# Patient Record
Sex: FEMALE | Race: BLACK OR AFRICAN AMERICAN | Employment: OTHER | URBAN - METROPOLITAN AREA
[De-identification: names, ages, dates, MRNs, and addresses within clinical notes are randomized per-mention and may not be internally consistent; named-entity substitution may affect disease eponyms.]

---

## 2017-02-07 RX ORDER — SIMVASTATIN 80 MG/1
TABLET, FILM COATED ORAL
Qty: 90 TAB | Refills: 0 | Status: SHIPPED | OUTPATIENT
Start: 2017-02-07 | End: 2017-06-06 | Stop reason: SDUPTHER

## 2017-02-09 RX ORDER — MECLIZINE HYDROCHLORIDE 25 MG/1
TABLET ORAL
Qty: 60 TAB | Refills: 1 | Status: SHIPPED | OUTPATIENT
Start: 2017-02-09 | End: 2017-05-15 | Stop reason: SDUPTHER

## 2017-02-10 RX ORDER — CLONAZEPAM 0.5 MG/1
0.5 TABLET ORAL 2 TIMES DAILY
Qty: 60 TAB | Refills: 1 | OUTPATIENT
Start: 2017-02-10 | End: 2017-05-15 | Stop reason: SDUPTHER

## 2017-02-13 RX ORDER — GABAPENTIN 100 MG/1
100 CAPSULE ORAL 3 TIMES DAILY
Qty: 270 CAP | Refills: 3 | Status: SHIPPED | OUTPATIENT
Start: 2017-02-13 | End: 2018-02-15 | Stop reason: SDUPTHER

## 2017-03-20 RX ORDER — METFORMIN HYDROCHLORIDE 500 MG/1
TABLET, EXTENDED RELEASE ORAL
Qty: 30 TAB | Refills: 2 | Status: SHIPPED | OUTPATIENT
Start: 2017-03-20 | End: 2017-07-03 | Stop reason: SDUPTHER

## 2017-03-24 ENCOUNTER — OFFICE VISIT (OUTPATIENT)
Dept: INTERNAL MEDICINE CLINIC | Age: 82
End: 2017-03-24

## 2017-03-24 ENCOUNTER — HOSPITAL ENCOUNTER (OUTPATIENT)
Dept: LAB | Age: 82
Discharge: HOME | End: 2017-03-24
Payer: MEDICARE

## 2017-03-24 VITALS
HEIGHT: 59 IN | SYSTOLIC BLOOD PRESSURE: 123 MMHG | BODY MASS INDEX: 28.63 KG/M2 | RESPIRATION RATE: 12 BRPM | OXYGEN SATURATION: 96 % | WEIGHT: 142 LBS | HEART RATE: 75 BPM | DIASTOLIC BLOOD PRESSURE: 70 MMHG | TEMPERATURE: 98.3 F

## 2017-03-24 DIAGNOSIS — E78.5 HYPERLIPIDEMIA LDL GOAL <100: ICD-10-CM

## 2017-03-24 DIAGNOSIS — E11.9 TYPE 2 DIABETES MELLITUS WITHOUT COMPLICATION, WITHOUT LONG-TERM CURRENT USE OF INSULIN (HCC): ICD-10-CM

## 2017-03-24 DIAGNOSIS — E11.9 TYPE 2 DIABETES MELLITUS WITHOUT COMPLICATION, WITHOUT LONG-TERM CURRENT USE OF INSULIN (HCC): Primary | ICD-10-CM

## 2017-03-24 DIAGNOSIS — I10 ESSENTIAL HYPERTENSION WITH GOAL BLOOD PRESSURE LESS THAN 140/90: ICD-10-CM

## 2017-03-24 LAB
ALBUMIN SERPL BCP-MCNC: 4.1 G/DL (ref 3.4–5)
ALBUMIN/GLOB SERPL: 1.2 {RATIO} (ref 0.8–1.7)
ALP SERPL-CCNC: 94 U/L (ref 45–117)
ALT SERPL-CCNC: 22 U/L (ref 13–56)
ANION GAP BLD CALC-SCNC: 8 MMOL/L (ref 3–18)
AST SERPL W P-5'-P-CCNC: 21 U/L (ref 15–37)
BILIRUB SERPL-MCNC: 0.3 MG/DL (ref 0.2–1)
BUN SERPL-MCNC: 12 MG/DL (ref 7–18)
BUN/CREAT SERPL: 13 (ref 12–20)
CALCIUM SERPL-MCNC: 9.2 MG/DL (ref 8.5–10.1)
CHLORIDE SERPL-SCNC: 106 MMOL/L (ref 100–108)
CHOLEST SERPL-MCNC: 153 MG/DL
CO2 SERPL-SCNC: 27 MMOL/L (ref 21–32)
CREAT SERPL-MCNC: 0.91 MG/DL (ref 0.6–1.3)
EST. AVERAGE GLUCOSE BLD GHB EST-MCNC: 143 MG/DL
GLOBULIN SER CALC-MCNC: 3.4 G/DL (ref 2–4)
GLUCOSE SERPL-MCNC: 111 MG/DL (ref 74–99)
HBA1C MFR BLD: 6.6 % (ref 4.2–5.6)
HDLC SERPL-MCNC: 62 MG/DL (ref 40–60)
HDLC SERPL: 2.5 {RATIO} (ref 0–5)
LDLC SERPL CALC-MCNC: 77 MG/DL (ref 0–100)
LIPID PROFILE,FLP: ABNORMAL
POTASSIUM SERPL-SCNC: 4.4 MMOL/L (ref 3.5–5.5)
PROT SERPL-MCNC: 7.5 G/DL (ref 6.4–8.2)
SODIUM SERPL-SCNC: 141 MMOL/L (ref 136–145)
TRIGL SERPL-MCNC: 70 MG/DL (ref ?–150)
VLDLC SERPL CALC-MCNC: 14 MG/DL

## 2017-03-24 PROCEDURE — 80053 COMPREHEN METABOLIC PANEL: CPT | Performed by: INTERNAL MEDICINE

## 2017-03-24 PROCEDURE — 83036 HEMOGLOBIN GLYCOSYLATED A1C: CPT | Performed by: INTERNAL MEDICINE

## 2017-03-24 PROCEDURE — 80061 LIPID PANEL: CPT | Performed by: INTERNAL MEDICINE

## 2017-03-24 NOTE — PROGRESS NOTES
Denisha Barkley 5/17/1926, is a 80 y.o. female, who is seen today for reevaluation of diabetes anxiety hyperlipidemia recurrent vertigo. She notes that twice over the last couple of months when she has strained stools using a trace of blood on the toilet paper. She has chronic constipation usually she treats that with zain greens. She has had no falls and vertigo has not bothered her for at least a few weeks. She takes her medicine regularly. 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     Essential hypertension, benign     Stable     Hypercholesterolemia     Hypertension     Other and unspecified hyperlipidemia     Per PMD/ on Zocor    Type II or unspecified type diabetes mellitus without mention of complication, not stated as uncontrolled      Past Surgical History:   Procedure Laterality Date    HX HEENT      bilateral cataract surgery    HX HEMORRHOIDECTOMY      HX ORTHOPAEDIC      bunionectomy    HX OTHER SURGICAL      Esophageal dilatation     Current Outpatient Prescriptions   Medication Sig Dispense Refill    metFORMIN ER (GLUCOPHAGE XR) 500 mg tablet TAKE 1 TABLET BY MOUTH EVERY DAY IN THE MORNING WITH BREAKFAST 30 Tab 2    gabapentin (NEURONTIN) 100 mg capsule Take 1 Cap by mouth three (3) times daily. 270 Cap 3    clonazePAM (KLONOPIN) 0.5 mg tablet Take 1 Tab by mouth two (2) times a day. Max Daily Amount: 1 mg. 60 Tab 1    meclizine (ANTIVERT) 25 mg tablet TAKE 1 TABLET BY MOUTH 3 TIMES A DAY AS NEEDED FOR VERTIGO 60 Tab 1    simvastatin (ZOCOR) 80 mg tablet TAKE 1 TABLET BY MOUTH DAILY 90 Tab 0    Lancets (MICROLET LANCET) misc Once daily, DX: E11.9 100 Each 3    Blood Sugar Diagnostic, Disc (ASCENSIA BREEZE 2) strp Tests once daily, diagnosis E 11.9 100 Strip 3    senna leaf tea Take  by mouth.  ADULT ASPIRIN EC LOW STRENGTH PO Take 81 mg by mouth daily.       senna (SENNA LAX) 8.6 mg tablet Take 1 Tab by mouth. Take 3 at bedtime. No Known Allergies  Social History     Social History    Marital status:      Spouse name: N/A    Number of children: N/A    Years of education: N/A     Social History Main Topics    Smoking status: Never Smoker    Smokeless tobacco: Never Used    Alcohol use No    Drug use: No    Sexual activity: Not Currently     Other Topics Concern    None     Social History Narrative     Visit Vitals    /70    Pulse 75    Temp 98.3 °F (36.8 °C) (Oral)    Resp 12    Ht 4' 11\" (1.499 m)    Wt 142 lb (64.4 kg)    SpO2 96%    BMI 28.68 kg/m2     Carotids are 2+ without bruits. No adenopathy or thyromegaly. Lungs are clear to percussion. Good breath sounds with no wheezing or crackles. Heart reveals a regular rhythm with normal S1 and S2 no murmur gallop click or rub. Apical impulse is not palpable. Abdomen is soft and nontender with no hepatosplenomegaly or masses and no bruits. Extremities reveal no clubbing cyanosis or edema. Pulses are intact throughout. Breasts reveal no masses no skin or nipple abnormalities and no axillary adenopathy. Rectal exam reveals some minor hemorrhoidal tags but no active bleeding. Stool is light brown Hemoccult negative. No mucosal abnormalities are palpable. Assessment: #1.  Diabetes has been doing well. We will check hemoglobin A1c and random glucose this morning. She will continue metformin  mg each morning. #2. Hyperlipidemia has been doing well. We will check lipids today and she will continue simvastatin 80 mg each evening. #3. Some recent bright red blood per rectum with straining at stool and will certainly related to hemorrhoidal disease. She will call if bleeding persists. She will try using prune juice regularly. #4.  Anxiety doing well. She will continue clonazepam 0.5 mg as needed. #5.  History of recurrent vertigo, she will use meclizine as needed.     Follow-up 4 months with [facility] ZAYNAB Klaudia Eisenberg MD FACP    Please note: This document has been produced using voice recognition software. Unrecognized errors in transcription may be present.

## 2017-03-24 NOTE — MR AVS SNAPSHOT
Visit Information Date & Time Provider Department Dept. Phone Encounter #  
 3/24/2017  8:30 AM Garry Villarreal MD Internist of [address] [place] [address] [id number] Follow-up Instructions Follow-up and Disposition History Your Appointments 7/24/2017  7:55 AM  
LAB with [facility] NURSE VISIT Internist of [facility] ([facility]) Appt Note: labs 4mos. rm  
 [address], Suite [facility] [address]  
  
   
 [address], [address]  
  
    
 7/24/2017  8:15 AM  
Office Visit with Garry Villarreal MD  
Internist of [address] [place] [address] Appt Note: ov 4mos rm  
 [address] [facility], [facility] [address]  
  
   
 [address], [address] Upcoming Health Maintenance Date Due DTaP/Tdap/Td series (1 - Tdap) 5/17/1947 FOOT EXAM Q1 8/25/2016 MEDICARE YEARLY EXAM 11/16/2016 HEMOGLOBIN A1C Q6M 5/21/2017 MICROALBUMIN Q1 7/11/2017 EYE EXAM RETINAL OR DILATED Q1 9/28/2017 LIPID PANEL Q1 11/21/2017 GLAUCOMA SCREENING Q2Y 9/28/2018 Allergies as of 3/24/2017  Review Complete On: 3/24/2017 By: Garry Villarreal MD  
 No Known Allergies Current Immunizations  Reviewed on 11/21/2016 Name Date Influenza High Dose Vaccine PF 10/5/2016  9:54 AM  
 Influenza Vaccine 9/20/2014 Pneumococcal Conjugate (PCV-13) 11/21/2016 11:46 AM  
 Pneumococcal Polysaccharide (PPSV-23) 11/17/2015 Not reviewed this visit You Were Diagnosed With   
  
 Codes Comments Type 2 diabetes mellitus without complication, without long-term current use of insulin (HCC)    -  Primary ICD-10-CM: E11.9 ICD-9-CM: 250.00 Essential hypertension with goal blood pressure less than 140/90     ICD-10-CM: I10 
ICD-9-CM: 401.9 Hyperlipidemia LDL goal <100     ICD-10-CM: E78.5 ICD-9-CM: 272.4 Vitals BP Pulse Temp Resp Height(growth percentile) Weight(growth percentile) 123/70 75 98.3 °F (36.8 °C) (Oral) 12 4' 11\" (1.499 m) 142 lb (64.4 kg) SpO2 BMI OB Status Smoking Status 96% 28.68 kg/m2 Postmenopausal Never Smoker Vitals History BMI and BSA Data Body Mass Index Body Surface Area  
 28.68 kg/m 2 1.64 m 2 Preferred Pharmacy Pharmacy Name Phone [facility]ACY #[id number]- [address] [phone] Your Updated Medication List  
  
   
This list is accurate as of: 3/24/17  9:19 AM.  Always use your most recent med list.  
  
  
  
  
 ADULT ASPIRIN EC LOW STRENGTH PO Take 81 mg by mouth daily. Blood Sugar Diagnostic, Disc Strp Commonly known as:  Ascensia Breeze 2 Tests once daily, diagnosis E 11.9  
  
 clonazePAM 0.5 mg tablet Commonly known as:  Bryant Pen Take 1 Tab by mouth two (2) times a day. Max Daily Amount: 1 mg.  
  
 gabapentin 100 mg capsule Commonly known as:  NEURONTIN Take 1 Cap by mouth three (3) times daily. Lancets Misc Commonly known as:  Bernis Balding Once daily, DX: E11.9  
  
 meclizine 25 mg tablet Commonly known as:  ANTIVERT  
TAKE 1 TABLET BY MOUTH 3 TIMES A DAY AS NEEDED FOR VERTIGO  
  
 metFORMIN  mg tablet Commonly known as:  GLUCOPHAGE XR  
TAKE 1 TABLET BY MOUTH EVERY DAY IN THE MORNING WITH BREAKFAST SENNA LAX 8.6 mg tablet Generic drug:  senna Take 1 Tab by mouth. Take 3 at bedtime. senna leaf Tea Take  by mouth. simvastatin 80 mg tablet Commonly known as:  ZOCOR  
TAKE 1 TABLET BY MOUTH DAILY To-Do List   
 Around 03/24/2017 Lab:  HEMOGLOBIN A1C WITH EAG Around 03/24/2017 Lab:  LIPID PANEL Around 03/24/2017 Lab:  METABOLIC PANEL, COMPREHENSIVE Around 07/17/2017 Lab:  CBC WITH AUTOMATED DIFF Around 07/17/2017 Lab:  HEMOGLOBIN A1C WITH EAG Around 07/17/2017 Lab: LIPID PANEL Around 07/17/2017 Lab:  METABOLIC PANEL, COMPREHENSIVE Around 07/17/2017 Lab:  MICROALBUMIN, UR, RAND W/ MICROALBUMIN/CREA RATIO Around 07/17/2017 Lab:  T4, FREE Around 07/17/2017 Lab:  TSH 3RD GENERATION Around 07/17/2017 Lab:  URINALYSIS W/ RFLX MICROSCOPIC Introducing [facility] & HEALTH SERVICES! Charlotte Myrtle introduces eTobb patient portal. Now you can access parts of your medical record, email your doctor's office, and request medication refills online. 1. In your internet browser, go to [email or web address] 2. Click on the First Time User? Click Here link in the Sign In box. You will see the New Member Sign Up page. 3. Enter your eTobb Access Code exactly as it appears below. You will not need to use this code after youve completed the sign-up process. If you do not sign up before the expiration date, you must request a new code. · eTobb Access Code: [email or web address] Expires: 6/22/2017  9:19 AM 
 
4. Enter the last four digits of your Social Security Number (xxxx) and Date of Birth (mm/dd/yyyy) as indicated and click Submit. You will be taken to the next sign-up page. 5. Create a eTobb ID. This will be your eTobb login ID and cannot be changed, so think of one that is secure and easy to remember. 6. Create a eTobb password. You can change your password at any time. 7. Enter your Password Reset Question and Answer. This can be used at a later time if you forget your password. 8. Enter your e-mail address. You will receive e-mail notification when new information is available in [address] 9. Click Sign Up. You can now view and download portions of your medical record. 10. Click the Download Summary menu link to download a portable copy of your medical information.  
 
If you have questions, please visit the Frequently Asked Questions section of the InOpen. Remember, mydecohart is NOT to be used for urgent needs. For medical emergencies, dial 911. Now available from your iPhone and Android! Please provide this summary of care documentation to your next provider. Your primary care clinician is listed as Carol Jacob. Davina López. If you have any questions after today's visit, please call [phone].

## 2017-04-10 ENCOUNTER — TELEPHONE (OUTPATIENT)
Dept: INTERNAL MEDICINE CLINIC | Age: 82
End: 2017-04-10

## 2017-04-12 NOTE — TELEPHONE ENCOUNTER
Naomi Lopez MD   [facility] Nurses 18 hours ago (4:03 PM)                 The lab work looks good with cholesterol well controlled and glucose well controlled.  (Routing comment)

## 2017-04-17 ENCOUNTER — OFFICE VISIT (OUTPATIENT)
Dept: INTERNAL MEDICINE CLINIC | Age: 82
End: 2017-04-17

## 2017-04-17 VITALS
HEIGHT: 59 IN | SYSTOLIC BLOOD PRESSURE: 140 MMHG | TEMPERATURE: 98.4 F | DIASTOLIC BLOOD PRESSURE: 70 MMHG | HEART RATE: 77 BPM | BODY MASS INDEX: 29.56 KG/M2 | RESPIRATION RATE: 12 BRPM | OXYGEN SATURATION: 97 % | WEIGHT: 146.6 LBS

## 2017-04-17 DIAGNOSIS — M15.9 PRIMARY OSTEOARTHRITIS INVOLVING MULTIPLE JOINTS: ICD-10-CM

## 2017-04-17 DIAGNOSIS — H81.10 BENIGN PAROXYSMAL VERTIGO, UNSPECIFIED LATERALITY: ICD-10-CM

## 2017-04-17 DIAGNOSIS — I10 ESSENTIAL HYPERTENSION WITH GOAL BLOOD PRESSURE LESS THAN 140/90: Primary | ICD-10-CM

## 2017-04-17 NOTE — MR AVS SNAPSHOT
Visit Information Date & Time Provider Department Dept. Phone Encounter #  
 4/17/2017  4:00 PM Navi Lobo MD Internist of [address] [id number] Your Appointments 7/24/2017  7:55 AM  
LAB with [facility] NURSE VISIT Internist of [facility] ([facility]) Appt Note: labs 4mos. rm  
 [address], [address] [facility] [address]  
  
   
 [address], [address]  
  
    
 7/24/2017  8:15 AM  
Office Visit with Navi Lobo MD  
Internist of [address] [place] [address]) Appt Note: ov 4mos rm  
 [address] [facility], [address]  
  
   
 [address], [address] Upcoming Health Maintenance Date Due DTaP/Tdap/Td series (1 - Tdap) 5/17/1947 FOOT EXAM Q1 8/25/2016 MEDICARE YEARLY EXAM 11/16/2016 MICROALBUMIN Q1 7/11/2017 HEMOGLOBIN A1C Q6M 9/24/2017 EYE EXAM RETINAL OR DILATED Q1 9/28/2017 LIPID PANEL Q1 3/24/2018 GLAUCOMA SCREENING Q2Y 9/28/2018 Allergies as of 4/17/2017  Review Complete On: 4/17/2017 By: Navi Lobo MD  
 No Known Allergies Current Immunizations  Reviewed on 11/21/2016 Name Date Influenza High Dose Vaccine PF 10/5/2016  9:54 AM  
 Influenza Vaccine 9/20/2014 Pneumococcal Conjugate (PCV-13) 11/21/2016 11:46 AM  
 Pneumococcal Polysaccharide (PPSV-23) 11/17/2015 Not reviewed this visit You Were Diagnosed With   
  
 Codes Comments Essential hypertension with goal blood pressure less than 140/90    -  Primary ICD-10-CM: I10 
ICD-9-CM: 401.9 Benign paroxysmal vertigo, unspecified laterality     ICD-10-CM: H81.10 ICD-9-CM: 386.11 Primary osteoarthritis involving multiple joints     ICD-10-CM: M15.0 ICD-9-CM: 715.09 Vitals BP Pulse Temp Resp Height(growth percentile) Weight(growth percentile) 140/70 77 98.4 °F (36.9 °C) (Oral) 12 4' 11\" (1.499 m) 146 lb 9.6 oz (66.5 kg) SpO2 BMI OB Status Smoking Status 97% 29.61 kg/m2 Postmenopausal Never Smoker Vitals History BMI and BSA Data Body Mass Index Body Surface Area  
 29.61 kg/m 2 1.66 m 2 Preferred Pharmacy Pharmacy Name Phone [facility]/PHARMACY [id number] [phone] Your Updated Medication List  
  
   
This list is accurate as of: 4/17/17  4:33 PM.  Always use your most recent med list.  
  
  
  
  
 ADULT ASPIRIN EC LOW STRENGTH PO Take 81 mg by mouth daily. Blood Sugar Diagnostic, Disc Strp Commonly known as:  Ascensia Breeze 2 Tests once daily, diagnosis E 11.9  
  
 clonazePAM 0.5 mg tablet Commonly known as:  Calvin Resides Take 1 Tab by mouth two (2) times a day. Max Daily Amount: 1 mg.  
  
 gabapentin 100 mg capsule Commonly known as:  NEURONTIN Take 1 Cap by mouth three (3) times daily. Lancets Misc Commonly known as:  Aissatou Ellen Once daily, DX: E11.9  
  
 meclizine 25 mg tablet Commonly known as:  ANTIVERT  
TAKE 1 TABLET BY MOUTH 3 TIMES A DAY AS NEEDED FOR VERTIGO  
  
 metFORMIN  mg tablet Commonly known as:  GLUCOPHAGE XR  
TAKE 1 TABLET BY MOUTH EVERY DAY IN THE MORNING WITH BREAKFAST SENNA LAX 8.6 mg tablet Generic drug:  senna Take 1 Tab by mouth. Take 3 at bedtime. senna leaf Tea Take  by mouth. simvastatin 80 mg tablet Commonly known as:  ZOCOR  
TAKE 1 TABLET BY MOUTH DAILY Introducing [facility] & HEALTH SERVICES! Luzmaria Jimenez introduces [employer] patient portal. Now you can access parts of your medical record, email your doctor's office, and request medication refills online. 1. In your internet browser, go to [email or web address] 2. Click on the First Time User? Click Here link in the Sign In box. You will see the New Member Sign Up page. 3. Enter your FriendsEAT Access Code exactly as it appears below. You will not need to use this code after youve completed the sign-up process. If you do not sign up before the expiration date, you must request a new code. · FriendsEAT Access Code: [email or web address] Expires: 6/22/2017  9:19 AM 
 
4. Enter the last four digits of your Social Security Number (xxxx) and Date of Birth (mm/dd/yyyy) as indicated and click Submit. You will be taken to the next sign-up page. 5. Create a FriendsEAT ID. This will be your FriendsEAT login ID and cannot be changed, so think of one that is secure and easy to remember. 6. Create a FriendsEAT password. You can change your password at any time. 7. Enter your Password Reset Question and Answer. This can be used at a later time if you forget your password. 8. Enter your e-mail address. You will receive e-mail notification when new information is available in [address] 9. Click Sign Up. You can now view and download portions of your medical record. 10. Click the Download Summary menu link to download a portable copy of your medical information. If you have questions, please visit the Frequently Asked Questions section of the FriendsEAT website. Remember, FriendsEAT is NOT to be used for urgent needs. For medical emergencies, dial 911. Now available from your iPhone and Android! Please provide this summary of care documentation to your next provider. Your primary care clinician is listed as Jailene Murphy. If you have any questions after today's visit, please call [phone].

## 2017-04-17 NOTE — PROGRESS NOTES
Robbie Conte 5/17/1926, is a 80 y.o. female, who is seen today for evaluation of some arthritis in her hands and also concerned about recent mammography report and for follow-up of frequent episodes of vertigo. She went to an outside facility and got a letter that her mammogram was normal but she has dense breasts and depending on her risk of breast cancer further evaluation could be indicated. 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     Essential hypertension, benign     Stable     Hypercholesterolemia     Hypertension     Other and unspecified hyperlipidemia     Per PMD/ on Zocor    Type II or unspecified type diabetes mellitus without mention of complication, not stated as uncontrolled      Current Outpatient Prescriptions   Medication Sig Dispense Refill    metFORMIN ER (GLUCOPHAGE XR) 500 mg tablet TAKE 1 TABLET BY MOUTH EVERY DAY IN THE MORNING WITH BREAKFAST 30 Tab 2    gabapentin (NEURONTIN) 100 mg capsule Take 1 Cap by mouth three (3) times daily. 270 Cap 3    clonazePAM (KLONOPIN) 0.5 mg tablet Take 1 Tab by mouth two (2) times a day. Max Daily Amount: 1 mg. 60 Tab 1    meclizine (ANTIVERT) 25 mg tablet TAKE 1 TABLET BY MOUTH 3 TIMES A DAY AS NEEDED FOR VERTIGO 60 Tab 1    simvastatin (ZOCOR) 80 mg tablet TAKE 1 TABLET BY MOUTH DAILY 90 Tab 0    Lancets (MICROLET LANCET) misc Once daily, DX: E11.9 100 Each 3    Blood Sugar Diagnostic, Disc (ASCENSIA BREEZE 2) strp Tests once daily, diagnosis E 11.9 100 Strip 3    senna leaf tea Take  by mouth.  ADULT ASPIRIN EC LOW STRENGTH PO Take 81 mg by mouth daily.  senna (SENNA LAX) 8.6 mg tablet Take 1 Tab by mouth. Take 3 at bedtime.        Visit Vitals    /70    Pulse 77    Temp 98.4 °F (36.9 °C) (Oral)    Resp 12    Ht 4' 11\" (1.499 m)    Wt 146 lb 9.6 oz (66.5 kg)    SpO2 97%    BMI 29.61 kg/m2     She has very slightly enlarged PIP joints of several fingers on both hands but no redness or effusions. No tenderness. She has full flexion of the fingers but lacks about 5° of extension of her middle digits bilaterally. Her breasts reveal no masses no skin or nipple abnormalities and no axillary adenopathy. There is no tenderness in the breast do not feel particularly dense. Assessment: #1. She is reassured that her breasts are normal to exam and she has normal mammograms and that I am not sure why the radiologist felt it necessary to put in so much detail about possible risks when everything is normal.  It is not unusual letter. #2.  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 She is reassured that this is quite normal for her age and since there is only minimal stiffness and no real pain no medication as needed. #3.  Paroxysmal vertigo finally doing better.

## 2017-05-15 RX ORDER — CLONAZEPAM 0.5 MG/1
0.5 TABLET ORAL 2 TIMES DAILY
Qty: 60 TAB | Refills: 1 | OUTPATIENT
Start: 2017-05-15 | End: 2017-05-15 | Stop reason: SDUPTHER

## 2017-05-15 RX ORDER — CLONAZEPAM 0.5 MG/1
0.5 TABLET ORAL 2 TIMES DAILY
Qty: 60 TAB | Refills: 1 | OUTPATIENT
Start: 2017-05-15 | End: 2017-10-05 | Stop reason: SDUPTHER

## 2017-05-15 RX ORDER — MECLIZINE HYDROCHLORIDE 25 MG/1
TABLET ORAL
Qty: 60 TAB | Refills: 1 | Status: SHIPPED | OUTPATIENT
Start: 2017-05-15 | End: 2017-08-19 | Stop reason: SDUPTHER

## 2017-06-06 RX ORDER — SIMVASTATIN 80 MG/1
TABLET, FILM COATED ORAL
Qty: 90 TAB | Refills: 0 | Status: SHIPPED | OUTPATIENT
Start: 2017-06-06 | End: 2017-09-30 | Stop reason: SDUPTHER

## 2017-06-06 RX ORDER — SIMVASTATIN 80 MG/1
TABLET, FILM COATED ORAL
Qty: 90 TAB | Refills: 0 | OUTPATIENT
Start: 2017-06-06

## 2017-07-03 RX ORDER — METFORMIN HYDROCHLORIDE 500 MG/1
TABLET, EXTENDED RELEASE ORAL
Qty: 30 TAB | Refills: 2 | Status: SHIPPED | OUTPATIENT
Start: 2017-07-03 | End: 2017-08-07 | Stop reason: SDUPTHER

## 2017-07-14 ENCOUNTER — TELEPHONE (OUTPATIENT)
Dept: INTERNAL MEDICINE CLINIC | Age: 82
End: 2017-07-14

## 2017-07-17 DIAGNOSIS — E11.9 TYPE 2 DIABETES MELLITUS WITHOUT COMPLICATION, WITHOUT LONG-TERM CURRENT USE OF INSULIN (HCC): ICD-10-CM

## 2017-07-17 DIAGNOSIS — E78.5 HYPERLIPIDEMIA LDL GOAL <100: ICD-10-CM

## 2017-07-17 DIAGNOSIS — I10 ESSENTIAL HYPERTENSION WITH GOAL BLOOD PRESSURE LESS THAN 140/90: ICD-10-CM

## 2017-07-24 ENCOUNTER — HOSPITAL ENCOUNTER (OUTPATIENT)
Dept: LAB | Age: 82
Discharge: HOME | End: 2017-07-24
Payer: MEDICARE

## 2017-07-24 ENCOUNTER — OFFICE VISIT (OUTPATIENT)
Dept: INTERNAL MEDICINE CLINIC | Age: 82
End: 2017-07-24

## 2017-07-24 VITALS
OXYGEN SATURATION: 97 % | HEART RATE: 74 BPM | TEMPERATURE: 98.6 F | DIASTOLIC BLOOD PRESSURE: 70 MMHG | HEIGHT: 59 IN | WEIGHT: 146 LBS | BODY MASS INDEX: 29.43 KG/M2 | SYSTOLIC BLOOD PRESSURE: 132 MMHG | RESPIRATION RATE: 14 BRPM

## 2017-07-24 DIAGNOSIS — H81.10 BENIGN PAROXYSMAL VERTIGO, UNSPECIFIED LATERALITY: ICD-10-CM

## 2017-07-24 DIAGNOSIS — E11.9 TYPE 2 DIABETES MELLITUS WITHOUT COMPLICATION, WITHOUT LONG-TERM CURRENT USE OF INSULIN (HCC): Primary | ICD-10-CM

## 2017-07-24 DIAGNOSIS — E78.5 HYPERLIPIDEMIA LDL GOAL <100: ICD-10-CM

## 2017-07-24 DIAGNOSIS — E11.9 TYPE 2 DIABETES MELLITUS WITHOUT COMPLICATION, WITHOUT LONG-TERM CURRENT USE OF INSULIN (HCC): ICD-10-CM

## 2017-07-24 DIAGNOSIS — I10 ESSENTIAL HYPERTENSION WITH GOAL BLOOD PRESSURE LESS THAN 140/90: ICD-10-CM

## 2017-07-24 DIAGNOSIS — K59.09 CHRONIC CONSTIPATION: ICD-10-CM

## 2017-07-24 LAB
ALBUMIN SERPL BCP-MCNC: 3.9 G/DL (ref 3.4–5)
ALBUMIN/GLOB SERPL: 1.1 {RATIO} (ref 0.8–1.7)
ALP SERPL-CCNC: 81 U/L (ref 45–117)
ALT SERPL-CCNC: 23 U/L (ref 13–56)
ANION GAP BLD CALC-SCNC: 9 MMOL/L (ref 3–18)
APPEARANCE UR: CLEAR
AST SERPL W P-5'-P-CCNC: 18 U/L (ref 15–37)
BACTERIA URNS QL MICRO: ABNORMAL /HPF
BASOPHILS # BLD AUTO: 0 K/UL (ref 0–0.06)
BASOPHILS # BLD: 0 % (ref 0–2)
BILIRUB SERPL-MCNC: 0.3 MG/DL (ref 0.2–1)
BILIRUB UR QL: NEGATIVE
BUN SERPL-MCNC: 8 MG/DL (ref 7–18)
BUN/CREAT SERPL: 9 (ref 12–20)
CALCIUM SERPL-MCNC: 9.4 MG/DL (ref 8.5–10.1)
CHLORIDE SERPL-SCNC: 109 MMOL/L (ref 100–108)
CHOLEST SERPL-MCNC: 113 MG/DL
CO2 SERPL-SCNC: 25 MMOL/L (ref 21–32)
COLOR UR: YELLOW
CREAT SERPL-MCNC: 0.89 MG/DL (ref 0.6–1.3)
DIFFERENTIAL METHOD BLD: NORMAL
EOSINOPHIL # BLD: 0.2 K/UL (ref 0–0.4)
EOSINOPHIL NFR BLD: 3 % (ref 0–5)
EPITH CASTS URNS QL MICRO: ABNORMAL /LPF (ref 0–5)
ERYTHROCYTE [DISTWIDTH] IN BLOOD BY AUTOMATED COUNT: 14 % (ref 11.6–14.5)
EST. AVERAGE GLUCOSE BLD GHB EST-MCNC: 148 MG/DL
GLOBULIN SER CALC-MCNC: 3.6 G/DL (ref 2–4)
GLUCOSE SERPL-MCNC: 135 MG/DL (ref 74–99)
GLUCOSE UR STRIP.AUTO-MCNC: NEGATIVE MG/DL
HBA1C MFR BLD: 6.8 % (ref 4.2–5.6)
HCT VFR BLD AUTO: 43.7 % (ref 35–45)
HDLC SERPL-MCNC: 59 MG/DL (ref 40–60)
HDLC SERPL: 1.9 {RATIO} (ref 0–5)
HGB BLD-MCNC: 14 G/DL (ref 12–16)
HGB UR QL STRIP: NEGATIVE
KETONES UR QL STRIP.AUTO: NEGATIVE MG/DL
LDLC SERPL CALC-MCNC: 44 MG/DL (ref 0–100)
LEUKOCYTE ESTERASE UR QL STRIP.AUTO: ABNORMAL
LIPID PROFILE,FLP: NORMAL
LYMPHOCYTES # BLD AUTO: 32 % (ref 21–52)
LYMPHOCYTES # BLD: 2.3 K/UL (ref 0.9–3.6)
MCH RBC QN AUTO: 27.5 PG (ref 24–34)
MCHC RBC AUTO-ENTMCNC: 32 G/DL (ref 31–37)
MCV RBC AUTO: 85.9 FL (ref 74–97)
MONOCYTES # BLD: 0.5 K/UL (ref 0.05–1.2)
MONOCYTES NFR BLD AUTO: 8 % (ref 3–10)
NEUTS SEG # BLD: 4.1 K/UL (ref 1.8–8)
NEUTS SEG NFR BLD AUTO: 57 % (ref 40–73)
NITRITE UR QL STRIP.AUTO: NEGATIVE
PH UR STRIP: 6.5 [PH] (ref 5–8)
PLATELET # BLD AUTO: 176 K/UL (ref 135–420)
PMV BLD AUTO: 11.4 FL (ref 9.2–11.8)
POTASSIUM SERPL-SCNC: 4.6 MMOL/L (ref 3.5–5.5)
PROT SERPL-MCNC: 7.5 G/DL (ref 6.4–8.2)
PROT UR STRIP-MCNC: NEGATIVE MG/DL
RBC # BLD AUTO: 5.09 M/UL (ref 4.2–5.3)
RBC #/AREA URNS HPF: 0 /HPF (ref 0–5)
SODIUM SERPL-SCNC: 143 MMOL/L (ref 136–145)
SP GR UR REFRACTOMETRY: 1.01 (ref 1–1.03)
T4 FREE SERPL-MCNC: 1 NG/DL (ref 0.7–1.5)
TRIGL SERPL-MCNC: 50 MG/DL (ref ?–150)
TSH SERPL DL<=0.05 MIU/L-ACNC: 0.79 UIU/ML (ref 0.36–3.74)
UROBILINOGEN UR QL STRIP.AUTO: 0.2 EU/DL (ref 0.2–1)
VLDLC SERPL CALC-MCNC: 10 MG/DL
WBC # BLD AUTO: 7.1 K/UL (ref 4.6–13.2)
WBC URNS QL MICRO: ABNORMAL /HPF (ref 0–4)

## 2017-07-24 PROCEDURE — 80053 COMPREHEN METABOLIC PANEL: CPT | Performed by: INTERNAL MEDICINE

## 2017-07-24 PROCEDURE — 84443 ASSAY THYROID STIM HORMONE: CPT | Performed by: INTERNAL MEDICINE

## 2017-07-24 PROCEDURE — 81001 URINALYSIS AUTO W/SCOPE: CPT | Performed by: INTERNAL MEDICINE

## 2017-07-24 PROCEDURE — 85025 COMPLETE CBC W/AUTO DIFF WBC: CPT | Performed by: INTERNAL MEDICINE

## 2017-07-24 PROCEDURE — 84439 ASSAY OF FREE THYROXINE: CPT | Performed by: INTERNAL MEDICINE

## 2017-07-24 PROCEDURE — 83036 HEMOGLOBIN GLYCOSYLATED A1C: CPT | Performed by: INTERNAL MEDICINE

## 2017-07-24 PROCEDURE — 82043 UR ALBUMIN QUANTITATIVE: CPT | Performed by: INTERNAL MEDICINE

## 2017-07-24 PROCEDURE — 80061 LIPID PANEL: CPT | Performed by: INTERNAL MEDICINE

## 2017-07-24 NOTE — MR AVS SNAPSHOT
Visit Information Date & Time Provider Department Dept. Phone Encounter #  
 7/24/2017  8:15 AM Zack Lerma MD Internist of [address] [id number] Follow-up Instructions Return in about 4 months (around 11/24/2017). Your Appointments 11/21/2017  8:30 AM  
Office Visit with Zack Lerma MD  
Internist of [address] [place] [address] Appt Note: ov 4mos. rm  
 [address], [address] [address] [place] [address] [place]  
  
   
 [address] [facility], [facility] Upcoming Health Maintenance Date Due DTaP/Tdap/Td series (1 - Tdap) 5/17/1947 MEDICARE YEARLY EXAM 11/16/2016 MICROALBUMIN Q1 7/11/2017 INFLUENZA AGE 9 TO ADULT 8/1/2017 HEMOGLOBIN A1C Q6M 9/24/2017 EYE EXAM RETINAL OR DILATED Q1 9/28/2017 LIPID PANEL Q1 3/24/2018 FOOT EXAM Q1 7/24/2018 GLAUCOMA SCREENING Q2Y 9/28/2018 Allergies as of 7/24/2017  Review Complete On: 7/24/2017 By: Zack Lerma MD  
 No Known Allergies Current Immunizations  Reviewed on 11/21/2016 Name Date Influenza High Dose Vaccine PF 10/5/2016  9:54 AM  
 Influenza Vaccine 9/20/2014 Pneumococcal Conjugate (PCV-13) 11/21/2016 11:46 AM  
 Pneumococcal Polysaccharide (PPSV-23) 11/17/2015 Not reviewed this visit You Were Diagnosed With   
  
 Codes Comments Type 2 diabetes mellitus without complication, without long-term current use of insulin (HCC)    -  Primary ICD-10-CM: E11.9 ICD-9-CM: 250.00 Benign paroxysmal vertigo, unspecified laterality     ICD-10-CM: H81.10 ICD-9-CM: 386.11 Hyperlipidemia LDL goal <100     ICD-10-CM: E78.5 ICD-9-CM: 272.4 Essential hypertension with goal blood pressure less than 140/90     ICD-10-CM: I10 
ICD-9-CM: 401.9 Chronic constipation     ICD-10-CM: K59.09 
ICD-9-CM: 564.00 Vitals BP Pulse Temp Resp Height(growth percentile) Weight(growth percentile) 132/70 74 98.6 °F (37 °C) (Oral) 14 4' 11\" (1.499 m) 146 lb (66.2 kg) SpO2 BMI OB Status Smoking Status 97% 29.49 kg/m2 Postmenopausal Never Smoker Vitals History BMI and BSA Data Body Mass Index Body Surface Area  
 29.49 kg/m 2 1.66 m 2 Preferred Pharmacy Pharmacy Name Phone [id number] Francisco [phone] Your Updated Medication List  
  
   
This list is accurate as of: 7/24/17  8:47 AM.  Always use your most recent med list.  
  
  
  
  
 ADULT ASPIRIN EC LOW STRENGTH PO Take 81 mg by mouth daily. Blood Sugar Diagnostic, Disc Strp Commonly known as:  Ascensia Breeze 2 Tests once daily, diagnosis E 11.9  
  
 clonazePAM 0.5 mg tablet Commonly known as:  Waynard Hankins Take 1 Tab by mouth two (2) times a day. Max Daily Amount: 1 mg.  
  
 gabapentin 100 mg capsule Commonly known as:  NEURONTIN Take 1 Cap by mouth three (3) times daily. Lancets Misc Commonly known as:  Arpan Delgado Once daily, DX: E11.9  
  
 meclizine 25 mg tablet Commonly known as:  ANTIVERT  
TAKE 1 TABLET BY MOUTH 3 TIMES A DAY AS NEEDED FOR VERTIGO  
  
 metFORMIN  mg tablet Commonly known as:  GLUCOPHAGE XR  
TAKE 1 TABLET BY MOUTH EVERY DAY IN THE MORNING WITH BREAKFAST SENNA LAX 8.6 mg tablet Generic drug:  senna Take 1 Tab by mouth. Take 3 at bedtime. simvastatin 80 mg tablet Commonly known as:  ZOCOR  
TAKE 1 TABLET BY MOUTH DAILY Follow-up Instructions Return in about 4 months (around 11/24/2017). Patient Instructions Advance Directives: Care Instructions Your Care Instructions An advance directive is a legal way to state your wishes at the end of your life. It tells your family and your doctor what to do if you can no longer say what you want. There are two main types of advance directives. You can change them any time that your wishes change. · A living will tells your family and your doctor your wishes about life support and other treatment. · A durable power of  for health care lets you name a person to make treatment decisions for you when you can't speak for yourself. This person is called a health care agent. If you do not have an advance directive, decisions about your medical care may be made by a doctor or a  who doesn't know you. It may help to think of an advance directive as a gift to the people who care for you. If you have one, they won't have to make tough decisions by themselves. Follow-up care is a key part of your treatment and safety. Be sure to make and go to all appointments, and call your doctor if you are having problems. It's also a good idea to know your test results and keep a list of the medicines you take. How can you care for yourself at home? · Discuss your wishes with your loved ones and your doctor. This way, there are no surprises. · Many states have a unique form. Or you might use a universal form that has been approved by many states. This kind of form can sometimes be completed and stored online. Your electronic copy will then be available wherever you have a connection to the Internet. In most cases, doctors will respect your wishes even if you have a form from a different state. · You don't need a  to do an advance directive. But you may want to get legal advice. · Think about these questions when you prepare an advance directive: ¨ Who do you want to make decisions about your medical care if you are not able to? Many people choose a family member or close friend. ¨ Do you know enough about life support methods that might be used? If not, talk to your doctor so you understand. ¨ What are you most afraid of that might happen? You might be afraid of having pain, losing your independence, or being kept alive by machines. ¨ Where would you prefer to die? Choices include your home, a hospital, or a nursing home. ¨ Would you like to have information about hospice care to support you and your family? ¨ Do you want to donate organs when you die? ¨ Do you want certain [employer] practices performed before you die? If so, put your wishes in the advance directive. · Read your advance directive every year, and make changes as needed. When should you call for help? Be sure to contact your doctor if you have any questions. Where can you learn more? Go to http://maura-jonna.info/. Enter R264 in the search box to learn more about \"Advance Directives: Care Instructions. \" Current as of: November 17, 2016 Content Version: 11.3 © 3730-9657 Healthwise, Incorporated. Care instructions adapted under license by [employer] (which disclaims liability or warranty for this information). If you have questions about a medical condition or this instruction, always ask your healthcare professional. [place] [address] any warranty or liability for your use of this information. Introducing [facility]! Adria Sampson introduces Arsanis patient portal. Now you can access parts of your medical record, email your doctor's office, and request medication refills online. 1. In your internet browser, go to [email or web address] 2. Click on the First Time User? Click Here link in the Sign In box. You will see the New Member Sign Up page. 3. Enter your Arsanis Access Code exactly as it appears below. You will not need to use this code after youve completed the sign-up process. If you do not sign up before the expiration date, you must request a new code. · Ar[employer]is Access Code: [email or web address] Expires: 10/22/2017  7:43 AM 
 
4. Enter the last four digits of your Social Security Number (xxxx) and Date of Birth (mm/dd/yyyy) as indicated and click Submit.  You will be taken to the next sign-up page. 5. Create a Seebright ID. This will be your Seebright login ID and cannot be changed, so think of one that is secure and easy to remember. 6. Create a Seebright password. You can change your password at any time. 7. Enter your Password Reset Question and Answer. This can be used at a later time if you forget your password. 8. Enter your e-mail address. You will receive e-mail notification when new information is available in [address]. 9. Click Sign Up. You can now view and download portions of your medical record. 10. Click the Download Summary menu link to download a portable copy of your medical information. If you have questions, please visit the Frequently Asked Questions section of the Seebright website. Remember, Seebright is NOT to be used for urgent needs. For medical emergencies, dial 911. Now available from your iPhone and Android! Please provide this summary of care documentation to your next provider. Your primary care clinician is listed as Randy Kenyon. If you have any questions after today's visit, please call [phone].

## 2017-07-24 NOTE — PATIENT INSTRUCTIONS
Advance Directives: Care Instructions  Your Care Instructions  An advance directive is a legal way to state your wishes at the end of your life. It tells your family and your doctor what to do if you can no longer say what you want. There are two main types of advance directives. You can change them any time that your wishes change. · A living will tells your family and your doctor your wishes about life support and other treatment. · A durable power of  for health care lets you name a person to make treatment decisions for you when you can't speak for yourself. This person is called a health care agent. If you do not have an advance directive, decisions about your medical care may be made by a doctor or a  who doesn't know you. It may help to think of an advance directive as a gift to the people who care for you. If you have one, they won't have to make tough decisions by themselves. Follow-up care is a key part of your treatment and safety. Be sure to make and go to all appointments, and call your doctor if you are having problems. It's also a good idea to know your test results and keep a list of the medicines you take. How can you care for yourself at home? · Discuss your wishes with your loved ones and your doctor. This way, there are no surprises. · Many states have a unique form. Or you might use a universal form that has been approved by many states. This kind of form can sometimes be completed and stored online. Your electronic copy will then be available wherever you have a connection to the Internet. In most cases, doctors will respect your wishes even if you have a form from a different state. · You don't need a  to do an advance directive. But you may want to get legal advice. · Think about these questions when you prepare an advance directive:  ¨ Who do you want to make decisions about your medical care if you are not able to?  Many people choose a family member or close friend. ¨ Do you know enough about life support methods that might be used? If not, talk to your doctor so you understand. ¨ What are you most afraid of that might happen? You might be afraid of having pain, losing your independence, or being kept alive by machines. ¨ Where would you prefer to die? Choices include your home, a hospital, or a nursing home. ¨ Would you like to have information about hospice care to support you and your family? ¨ Do you want to donate organs when you die? ¨ Do you want certain [employer] practices performed before you die? If so, put your wishes in the advance directive. · Read your advance directive every year, and make changes as needed. When should you call for help? Be sure to contact your doctor if you have any questions. Where can you learn more? Go to http://maura-jonna.info/. Enter R264 in the search box to learn more about \"Advance Directives: Care Instructions. \"  Current as of: November 17, 2016  Content Version: 11.3  © 0169-9291 Healthwise, Incorporated. Care instructions adapted under license by [employer] (which disclaims liability or warranty for this information). If you have questions about a medical condition or this instruction, always ask your healthcare professional. [address] any warranty or liability for your use of this information.

## 2017-07-24 NOTE — PROGRESS NOTES
1. Have you been to the ER, urgent care clinic or hospitalized since your last visit? NO.     2. Have you seen or consulted any other health care providers outside of the [address] [place] [address] since your last visit (Include any pap smears or colon screening)? NO      Do you have an Advanced Directive? YES     Would you like information on Advanced Directives?  NO    Health Maintenance Due   Topic Date Due    DTaP/Tdap/Td series (1 - Tdap) 05/17/1947    FOOT EXAM Q1  08/25/2016    MEDICARE YEARLY EXAM  11/16/2016    MICROALBUMIN Q1  07/11/2017

## 2017-07-24 NOTE — PROGRESS NOTES
Lucio Holm 5/17/1926, is a 80 y.o. female, who is seen today for reevaluation of diabetes hyperlipidemia chronic constipation chronic anxiety hypertension paroxysmal vertigo. She was having some dizziness earlier today but because she was coming in to have lab work she decided not to use meclizine or any of her other medicine. She has not had any falls from this vertigo. She has chronic constipation dating back to when she was in high school but using low-dose over-the-counter medication the evenings her bowels move quite well. She is following her diet and taking all of her medicine regularly. Her nerves are doing pretty well with clonazepam use when needed. 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     Essential hypertension, benign     Stable     Hypercholesterolemia     Hypertension     Other and unspecified hyperlipidemia     Per PMD/ on Zocor    Type II or unspecified type diabetes mellitus without mention of complication, not stated as uncontrolled      Current Outpatient Prescriptions   Medication Sig Dispense Refill    metFORMIN ER (GLUCOPHAGE XR) 500 mg tablet TAKE 1 TABLET BY MOUTH EVERY DAY IN THE MORNING WITH BREAKFAST 30 Tab 2    Blood Sugar Diagnostic, Disc (ASCENSIA BREEZE 2) strp Tests once daily, diagnosis E 11.9 100 Strip 3    simvastatin (ZOCOR) 80 mg tablet TAKE 1 TABLET BY MOUTH DAILY 90 Tab 0    clonazePAM (KLONOPIN) 0.5 mg tablet Take 1 Tab by mouth two (2) times a day. Max Daily Amount: 1 mg. 60 Tab 1    meclizine (ANTIVERT) 25 mg tablet TAKE 1 TABLET BY MOUTH 3 TIMES A DAY AS NEEDED FOR VERTIGO 60 Tab 1    gabapentin (NEURONTIN) 100 mg capsule Take 1 Cap by mouth three (3) times daily. 270 Cap 3    Lancets (MICROLET LANCET) misc Once daily, DX: E11.9 100 Each 3    ADULT ASPIRIN EC LOW STRENGTH PO Take 81 mg by mouth daily.  senna (SENNA LAX) 8.6 mg tablet Take 1 Tab by mouth. Take 3 at bedtime. Visit Vitals    /70    Pulse 74    Temp 98.6 °F (37 °C) (Oral)    Resp 14    Ht 4' 11\" (1.499 m)    Wt 146 lb (66.2 kg)    SpO2 97%    BMI 29.49 kg/m2     Carotids are 2+ without bruits. Lungs are clear to percussion. Good breath sounds with no wheezing or crackles. Heart reveals a regular rhythm with normal S1 and S2 no murmur gallop click or rub. Apical impulse is not palpable. Abdomen is soft and nontender with no hepatosplenomegaly or masses and no bruits. Extremities reveal no clubbing cyanosis or edema. Feet reveal no deformities ulcerations or calluses. Normal sensation to monofilament testing. Pulses are 2+ throughout. Assessment: #1.  Diabetes probably still doing well. We will check glucose and hemoglobin A1c this morning, she has not had breakfast.  She will continue metformin  mg daily. #2. Hyperlipidemia has been doing well. She will continue simvastatin 80 mg each evening. #3.  Paroxysmal vertigo, she will continue meclizine as needed. #4.  Chronic constipation, she will continue current laxative every evening. #5. Anxiety doing well. She will continue clonazepam 0.5 mg as needed. #6.  History of hypertension with blood pressure controlled on no added salt diet. She will continue no added salt diet. We will check lab now and follow-up will be in 4 months and we will do more labs then. That will save her a trip to the office for a lab appointment. Patrick Patel MD FACP    Please note: This document has been produced using voice recognition software. Unrecognized errors in transcription may be present.

## 2017-07-25 ENCOUNTER — TELEPHONE (OUTPATIENT)
Dept: INTERNAL MEDICINE CLINIC | Age: 82
End: 2017-07-25

## 2017-07-25 LAB
CREAT UR-MCNC: 49.06 MG/DL (ref 30–125)
MICROALBUMIN UR-MCNC: 1.7 MG/DL (ref 0–3)
MICROALBUMIN/CREAT UR-RTO: 35 MG/G (ref 0–30)

## 2017-07-25 NOTE — TELEPHONE ENCOUNTER
Spoke with patient and notified of good lab results, went over some of the numbers with her that she asked about. Verbalized understanding.

## 2017-07-25 NOTE — TELEPHONE ENCOUNTER
----- Message from Colten Beckham MD sent at 7/25/2017  3:07 PM EDT -----  Please notify the patient that all of her lab work looks good.

## 2017-08-07 RX ORDER — METFORMIN HYDROCHLORIDE 500 MG/1
TABLET, EXTENDED RELEASE ORAL
Qty: 90 TAB | Refills: 2 | Status: SHIPPED | OUTPATIENT
Start: 2017-08-07 | End: 2018-06-01 | Stop reason: SDUPTHER

## 2017-08-22 RX ORDER — MECLIZINE HYDROCHLORIDE 25 MG/1
TABLET ORAL
Qty: 60 TAB | Refills: 1 | Status: SHIPPED | OUTPATIENT
Start: 2017-08-22 | End: 2017-11-07 | Stop reason: SDUPTHER

## 2017-10-01 RX ORDER — SIMVASTATIN 80 MG/1
TABLET, FILM COATED ORAL
Qty: 90 TAB | Refills: 0 | Status: SHIPPED | OUTPATIENT
Start: 2017-10-01 | End: 2017-12-29 | Stop reason: SDUPTHER

## 2017-10-06 RX ORDER — CLONAZEPAM 0.5 MG/1
TABLET ORAL
Qty: 60 TAB | Refills: 1 | OUTPATIENT
Start: 2017-10-06 | End: 2017-11-21 | Stop reason: SDUPTHER

## 2017-11-07 RX ORDER — MECLIZINE HYDROCHLORIDE 25 MG/1
TABLET ORAL
Qty: 60 TAB | Refills: 1 | Status: SHIPPED | OUTPATIENT
Start: 2017-11-07 | End: 2018-01-16 | Stop reason: SDUPTHER

## 2017-11-21 ENCOUNTER — HOSPITAL ENCOUNTER (OUTPATIENT)
Dept: LAB | Age: 82
Discharge: HOME | End: 2017-11-21
Payer: MEDICARE

## 2017-11-21 ENCOUNTER — TELEPHONE (OUTPATIENT)
Dept: INTERNAL MEDICINE CLINIC | Age: 82
End: 2017-11-21

## 2017-11-21 ENCOUNTER — OFFICE VISIT (OUTPATIENT)
Dept: INTERNAL MEDICINE CLINIC | Age: 82
End: 2017-11-21

## 2017-11-21 VITALS
OXYGEN SATURATION: 97 % | BODY MASS INDEX: 28.63 KG/M2 | HEIGHT: 59 IN | HEART RATE: 71 BPM | WEIGHT: 142 LBS | RESPIRATION RATE: 12 BRPM | SYSTOLIC BLOOD PRESSURE: 122 MMHG | TEMPERATURE: 98.1 F | DIASTOLIC BLOOD PRESSURE: 70 MMHG

## 2017-11-21 DIAGNOSIS — E11.9 TYPE 2 DIABETES MELLITUS WITHOUT COMPLICATION, WITHOUT LONG-TERM CURRENT USE OF INSULIN (HCC): Primary | ICD-10-CM

## 2017-11-21 DIAGNOSIS — I10 ESSENTIAL HYPERTENSION WITH GOAL BLOOD PRESSURE LESS THAN 140/90: ICD-10-CM

## 2017-11-21 DIAGNOSIS — K59.09 CHRONIC CONSTIPATION: ICD-10-CM

## 2017-11-21 DIAGNOSIS — E78.5 HYPERLIPIDEMIA LDL GOAL <100: ICD-10-CM

## 2017-11-21 DIAGNOSIS — E11.9 TYPE 2 DIABETES MELLITUS WITHOUT COMPLICATION, WITHOUT LONG-TERM CURRENT USE OF INSULIN (HCC): ICD-10-CM

## 2017-11-21 LAB
ALBUMIN SERPL-MCNC: 4 G/DL (ref 3.4–5)
ALBUMIN/GLOB SERPL: 1.1 {RATIO} (ref 0.8–1.7)
ALP SERPL-CCNC: 87 U/L (ref 45–117)
ALT SERPL-CCNC: 25 U/L (ref 13–56)
ANION GAP SERPL CALC-SCNC: 6 MMOL/L (ref 3–18)
AST SERPL-CCNC: 22 U/L (ref 15–37)
BILIRUB SERPL-MCNC: 0.3 MG/DL (ref 0.2–1)
BUN SERPL-MCNC: 10 MG/DL (ref 7–18)
BUN/CREAT SERPL: 11 (ref 12–20)
CALCIUM SERPL-MCNC: 9.4 MG/DL (ref 8.5–10.1)
CHLORIDE SERPL-SCNC: 109 MMOL/L (ref 100–108)
CHOLEST SERPL-MCNC: 119 MG/DL
CO2 SERPL-SCNC: 30 MMOL/L (ref 21–32)
CREAT SERPL-MCNC: 0.9 MG/DL (ref 0.6–1.3)
EST. AVERAGE GLUCOSE BLD GHB EST-MCNC: 160 MG/DL
GLOBULIN SER CALC-MCNC: 3.6 G/DL (ref 2–4)
GLUCOSE SERPL-MCNC: 118 MG/DL (ref 74–99)
HBA1C MFR BLD: 7.2 % (ref 4.2–5.6)
HDLC SERPL-MCNC: 48 MG/DL (ref 40–60)
HDLC SERPL: 2.5 {RATIO} (ref 0–5)
LDLC SERPL CALC-MCNC: 50.6 MG/DL (ref 0–100)
LIPID PROFILE,FLP: NORMAL
POTASSIUM SERPL-SCNC: 4.5 MMOL/L (ref 3.5–5.5)
PROT SERPL-MCNC: 7.6 G/DL (ref 6.4–8.2)
SODIUM SERPL-SCNC: 145 MMOL/L (ref 136–145)
TRIGL SERPL-MCNC: 102 MG/DL (ref ?–150)
VLDLC SERPL CALC-MCNC: 20.4 MG/DL

## 2017-11-21 PROCEDURE — 80053 COMPREHEN METABOLIC PANEL: CPT | Performed by: INTERNAL MEDICINE

## 2017-11-21 PROCEDURE — 80061 LIPID PANEL: CPT | Performed by: INTERNAL MEDICINE

## 2017-11-21 PROCEDURE — 83036 HEMOGLOBIN GLYCOSYLATED A1C: CPT | Performed by: INTERNAL MEDICINE

## 2017-11-21 RX ORDER — CLONAZEPAM 0.5 MG/1
0.5 TABLET ORAL 2 TIMES DAILY
Qty: 60 TAB | Refills: 3 | Status: SHIPPED | OUTPATIENT
Start: 2017-11-21 | End: 2017-12-18 | Stop reason: SDUPTHER

## 2017-11-21 NOTE — PROGRESS NOTES
Ramez Poole 5/17/1926, is a 80 y.o. female, who is seen today for reevaluation of hypertension hyperlipidemia anxiety constipation vertigo diabetes. She is eating a healthy diet and her weight is stable. She uses milk of magnesia for constipation that works well, she has done that for years and she wants to know if anything is better than that. She takes all of her medicine correctly and has not had any vertigo recently. No chest pain or dyspnea. 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     Essential hypertension, benign     Stable     Hypercholesterolemia     Hypertension     Other and unspecified hyperlipidemia     Per PMD/ on Zocor    Type II or unspecified type diabetes mellitus without mention of complication, not stated as uncontrolled      Current Outpatient Prescriptions   Medication Sig Dispense Refill    clonazePAM (KLONOPIN) 0.5 mg tablet Take 1 Tab by mouth two (2) times a day. Max Daily Amount: 1 mg. 60 Tab 3    meclizine (ANTIVERT) 25 mg tablet TAKE 1 TABLET BY MOUTH 3 TIMES A DAY AS NEEDED FOR VERTIGO 60 Tab 1    simvastatin (ZOCOR) 80 mg tablet TAKE 1 TABLET BY MOUTH DAILY 90 Tab 0    metFORMIN ER (GLUCOPHAGE XR) 500 mg tablet TAKE 1 TABLET BY MOUTH EVERY DAY IN THE MORNING WITH BREAKFAST 90 Tab 2    Blood Sugar Diagnostic, Disc (ASCENSIA BREEZE 2) strp Tests once daily, diagnosis E 11.9 100 Strip 3    gabapentin (NEURONTIN) 100 mg capsule Take 1 Cap by mouth three (3) times daily. 270 Cap 3    Lancets (MICROLET LANCET) misc Once daily, DX: E11.9 100 Each 3    ADULT ASPIRIN EC LOW STRENGTH PO Take 81 mg by mouth daily. Visit Vitals    /70    Pulse 71    Temp 98.1 °F (36.7 °C) (Oral)    Resp 12    Ht 4' 11\" (1.499 m)    Wt 142 lb (64.4 kg)    SpO2 97%    BMI 28.68 kg/m2     Carotids are 2+ without bruits. Lungs are clear to percussion. Good breath sounds with no wheezing or crackles.   Heart reveals a regular rhythm with normal S1 and S2 no murmur gallop click or rub. Apical impulse is not palpable. Abdomen is soft and nontender with no hepatosplenomegaly or masses and no bruits. Extremities reveal no clubbing cyanosis or edema. Pulses are 2+. Assessment: #1.  Diabetes probably still well controlled. She will continue metformin 500 mg each morning. #2.  Vertigo doing better. She will use Antivert as needed. #3. Hyperlipidemia has been doing well, we will check lipids today fasting and she will continue simvastatin 80 mg each evening. #4.  Chronic anxiety has been doing well. She will continue clonazepam 0.5 mg twice daily as needed. #5.  Very mildly overweight but stable. For this lady's age and health her weight is normal.    Follow-up in 4 months and we will do labs then, we will draw lab now as well. Patrick Sequeira MD FACP    Please note: This document has been produced using voice recognition software. Unrecognized errors in transcription may be present.

## 2017-11-21 NOTE — MR AVS SNAPSHOT
Visit Information Date & Time Provider Department Dept. Phone Encounter #  
 11/21/2017  8:30 AM Yelena Huitron MD [facility] [address] [place] [address] [phone] [id number] Follow-up Instructions Follow-up and Disposition History Your Appointments 3/21/2018  8:30 AM  
Office Visit with Yelena Huitron MD  
[facility] [address] [place] [address]) Appt Note: 4 week f/u per RM will do lab when she is here for a morning appointment [address], [address] [facility] [address] [place] [address] [place]  
  
   
 [address] [facility] Upcoming Health Maintenance Date Due DTaP/Tdap/Td series (1 - Tdap) 5/17/1947 MEDICARE YEARLY EXAM 11/16/2016 EYE EXAM RETINAL OR DILATED Q1 9/28/2017 HEMOGLOBIN A1C Q6M 1/24/2018 FOOT EXAM Q1 7/24/2018 MICROALBUMIN Q1 7/24/2018 LIPID PANEL Q1 7/24/2018 GLAUCOMA SCREENING Q2Y 9/28/2018 Allergies as of 11/21/2017  Review Complete On: 11/21/2017 By: Yelena Huitron MD  
 No Known Allergies Current Immunizations  Reviewed on 11/21/2016 Name Date Influenza High Dose Vaccine PF 10/5/2016  9:54 AM  
 Influenza Vaccine 9/20/2014 Pneumococcal Conjugate (PCV-13) 11/21/2016 11:46 AM  
 Pneumococcal Polysaccharide (PPSV-23) 11/17/2015 Not reviewed this visit You Were Diagnosed With   
  
 Codes Comments Type 2 diabetes mellitus without complication, without long-term current use of insulin (HCC)    -  Primary ICD-10-CM: E11.9 ICD-9-CM: 250.00 Essential hypertension with goal blood pressure less than 140/90     ICD-10-CM: I10 
ICD-9-CM: 401.9 Hyperlipidemia LDL goal <100     ICD-10-CM: E78.5 ICD-9-CM: 272.4 Chronic constipation     ICD-10-CM: K59.09 
ICD-9-CM: 564.00 Vitals BP Pulse Temp Resp Height(growth percentile) Weight(growth percentile) 122/70 71 98.1 °F (36.7 °C) (Oral) 12 4' 11\" (1.499 m) 142 lb (64.4 kg) SpO2 BMI OB Status Smoking Status 97% 28.68 kg/m2 Postmenopausal Never Smoker Vitals History BMI and BSA Data Body Mass Index Body Surface Area  
 28.68 kg/m 2 1.64 m 2 Preferred Pharmacy [facility]acy Name Phone [id number] [phone] Your Updated Medication List  
  
   
This list is accurate as of: 11/21/17  8:46 AM.  Always use your most recent med list.  
  
  
  
  
 ADULT ASPIRIN EC LOW STRENGTH PO Take 81 mg by mouth daily. Blood Sugar Diagnostic, Disc Strp Commonly known as:  Ascensia Breeze 2 Tests once daily, diagnosis E 11.9  
  
 clonazePAM 0.5 mg tablet Commonly known as:  Cassandra Deep Take 1 Tab by mouth two (2) times a day. Max Daily Amount: 1 mg.  
  
 gabapentin 100 mg capsule Commonly known as:  NEURONTIN Take 1 Cap by mouth three (3) times daily. Lancets Misc Commonly known as:  Catheryn Goo Once daily, DX: E11.9  
  
 meclizine 25 mg tablet Commonly known as:  ANTIVERT  
TAKE 1 TABLET BY MOUTH 3 TIMES A DAY AS NEEDED FOR VERTIGO  
  
 metFORMIN  mg tablet Commonly known as:  GLUCOPHAGE XR  
TAKE 1 TABLET BY MOUTH EVERY DAY IN THE MORNING WITH BREAKFAST  
  
 simvastatin 80 mg tablet Commonly known as:  ZOCOR  
TAKE 1 TABLET BY MOUTH DAILY Prescriptions Printed Refills  
 clonazePAM (KLONOPIN) 0.5 mg tablet 3 Sig: Take 1 Tab by mouth two (2) times a day. Max Daily Amount: 1 mg. Class: Print Route: Oral  
  
To-Do List   
 Around 11/21/2017 Lab:  HEMOGLOBIN A1C WITH EAG Around 11/21/2017 Lab:  LIPID PANEL Around 11/21/2017 Lab:  METABOLIC PANEL, COMPREHENSIVE Introducing [facility] & HEALTH SERVICES! Christa Chisholm introduces [employer] patient portal. Now you can access parts of your medical record, email your doctor's office, and request medication refills online.    
 
1. In your internet browser, go to [email or web address] 2. Click on the First Time User? Click Here link in the Sign In box. You will see the New Member Sign Up page. 3. Enter your Mira Designs Access Code exactly as it appears below. You will not need to use this code after youve completed the sign-up process. If you do not sign up before the expiration date, you must request a new code. · M[employer]s Access Code: [email or web address] Expires: 2/19/2018  8:46 AM 
 
4. Enter the last four digits of your Social Security Number (xxxx) and Date of Birth (mm/dd/yyyy) as indicated and click Submit. You will be taken to the next sign-up page. 5. Create a [employer] Techn[employer] ID. This will be your Mira Designs login ID and cannot be changed, so think of one that is secure and easy to remember. 6. Create a Mira Designs password. You can change your password at any time. 7. Enter your Password Reset Question and Answer. This can be used at a later time if you forget your password. 8. Enter your e-mail address. You will receive e-mail notification when new information is available in [address] 9. Click Sign Up. You can now view and download portions of your medical record. 10. Click the Download Summary menu link to download a portable copy of your medical information. If you have questions, please visit the Frequently Asked Questions section of the Mira [employer]s website. Remember, M[employer]s is NOT to be used for urgent needs. For medical emergencies, dial 911. Now available from your iPhone and Android! Please provide this summary of care documentation to your next provider. Your primary care clinician is listed as Leonor Cotton. If you have any questions after today's visit, please call [phone].

## 2017-11-22 ENCOUNTER — TELEPHONE (OUTPATIENT)
Dept: INTERNAL MEDICINE CLINIC | Age: 82
End: 2017-11-22

## 2017-11-22 NOTE — TELEPHONE ENCOUNTER
----- Message from Candelario Elliott MD sent at 11/22/2017  7:34 AM EST -----  Please notify the patient that her lab looks good

## 2017-12-04 RX ORDER — METFORMIN HYDROCHLORIDE 500 MG/1
TABLET, EXTENDED RELEASE ORAL
Qty: 30 TAB | Refills: 2 | Status: SHIPPED | OUTPATIENT
Start: 2017-12-04 | End: 2018-03-23 | Stop reason: SDUPTHER

## 2017-12-07 ENCOUNTER — TELEPHONE (OUTPATIENT)
Dept: INTERNAL MEDICINE CLINIC | Age: 82
End: 2017-12-07

## 2017-12-07 NOTE — TELEPHONE ENCOUNTER
Al Hussein MD   [facility] Nurses 56 minutes ago (9:49 AM)                 She is still diabetic but her glucoses very well controlled with hemoglobin A1c of 7.2.  She should continue very healthy diet and current dose of metformin.  (Routing comment)                                TCB

## 2017-12-07 NOTE — TELEPHONE ENCOUNTER
PT has Type 2 diabetes- had labs done- was told it looks good- does she still have type 2 diabetes?  Please advise

## 2017-12-18 RX ORDER — CLONAZEPAM 0.5 MG/1
0.5 TABLET ORAL 2 TIMES DAILY
Qty: 60 TAB | Refills: 3 | OUTPATIENT
Start: 2017-12-18 | End: 2018-01-17 | Stop reason: SDUPTHER

## 2017-12-18 NOTE — TELEPHONE ENCOUNTER
PHONE IN [facility] reports the last fill date for Klonopin as 11/08/2017 for a 30 d/s. There appears to be no inconsistencies in regards to the prescribing of this medication. Last Visit: 11/21/2017 with MD Donavon Garcia    Next Appointment: 03/21/2018 with MD Donavon Garcia   Previous Refill Encounters: 11/21/2017 per MD Donavon Garcia #60 with 3 refills     Requested Prescriptions     Pending Prescriptions Disp Refills    clonazePAM (KLONOPIN) 0.5 mg tablet 60 Tab 3     Sig: Take 1 Tab by mouth two (2) times a day. Max Daily Amount: 1 mg.

## 2018-01-02 RX ORDER — SIMVASTATIN 80 MG/1
TABLET, FILM COATED ORAL
Qty: 90 TAB | Refills: 0 | Status: SHIPPED | OUTPATIENT
Start: 2018-01-02 | End: 2018-04-09 | Stop reason: SDUPTHER

## 2018-01-16 RX ORDER — MECLIZINE HYDROCHLORIDE 25 MG/1
25 TABLET ORAL
Qty: 60 TAB | Refills: 1 | Status: SHIPPED | OUTPATIENT
Start: 2018-01-16 | End: 2018-03-12 | Stop reason: SDUPTHER

## 2018-01-16 NOTE — TELEPHONE ENCOUNTER
Last Visit: 11/21/2017 with MD Belia Boyce    Next Appointment: 03/21/2017 with MD Belia Boyce   Previous Refill Encounters: 11/07/2017 per MD Belia Boyce #60 with 1 refill     Requested Prescriptions     Pending Prescriptions Disp Refills    meclizine (ANTIVERT) 25 mg tablet 60 Tab 1     Sig: Take 1 Tab by mouth three (3) times daily as needed.  Indications: Vertigo

## 2018-01-17 ENCOUNTER — TELEPHONE (OUTPATIENT)
Dept: INTERNAL MEDICINE CLINIC | Age: 83
End: 2018-01-17

## 2018-01-17 DIAGNOSIS — F41.9 ANXIETY: Primary | ICD-10-CM

## 2018-01-17 RX ORDER — CLONAZEPAM 0.5 MG/1
0.5 TABLET ORAL 2 TIMES DAILY
Qty: 60 TAB | Refills: 3 | Status: SHIPPED | OUTPATIENT
Start: 2018-01-17 | End: 2018-04-26 | Stop reason: SDUPTHER

## 2018-01-17 NOTE — TELEPHONE ENCOUNTER
[facility] [address] is calling. Klonopin was called in to [facility] in [place]. Because of their state laws they are not allowed to transfer the rx. [facility] [address].

## 2018-01-30 ENCOUNTER — TELEPHONE (OUTPATIENT)
Dept: INTERNAL MEDICINE CLINIC | Age: 83
End: 2018-01-30

## 2018-01-30 RX ORDER — BLOOD-GLUCOSE CONTROL, NORMAL
EACH MISCELLANEOUS
Qty: 100 LANCET | Refills: 3 | Status: SHIPPED | OUTPATIENT
Start: 2018-01-30 | End: 2018-08-14 | Stop reason: ALTCHOICE

## 2018-01-30 RX ORDER — BLOOD-GLUCOSE METER
EACH MISCELLANEOUS
Qty: 1 EACH | Refills: 0 | Status: SHIPPED | OUTPATIENT
Start: 2018-01-30 | End: 2018-01-31 | Stop reason: ALTCHOICE

## 2018-01-30 NOTE — TELEPHONE ENCOUNTER
Pt's niece, Mick Orosco, called, said patient needs a NEW diabetic \"kit\" sent in to [facility] at [phone] for a One Touch Meter, Delica (?) lancets, and #25 Glucose strips. Please call patient when script has been called in at [phone].

## 2018-01-31 ENCOUNTER — OFFICE VISIT (OUTPATIENT)
Dept: INTERNAL MEDICINE CLINIC | Age: 83
End: 2018-01-31

## 2018-01-31 VITALS
HEART RATE: 81 BPM | HEIGHT: 59 IN | RESPIRATION RATE: 12 BRPM | BODY MASS INDEX: 30.12 KG/M2 | WEIGHT: 149.4 LBS | OXYGEN SATURATION: 97 % | DIASTOLIC BLOOD PRESSURE: 70 MMHG | TEMPERATURE: 98.3 F | SYSTOLIC BLOOD PRESSURE: 118 MMHG

## 2018-01-31 DIAGNOSIS — I10 ESSENTIAL HYPERTENSION WITH GOAL BLOOD PRESSURE LESS THAN 140/90: ICD-10-CM

## 2018-01-31 DIAGNOSIS — E11.9 TYPE 2 DIABETES MELLITUS WITHOUT COMPLICATION, WITHOUT LONG-TERM CURRENT USE OF INSULIN (HCC): Primary | ICD-10-CM

## 2018-01-31 DIAGNOSIS — K59.09 CHRONIC CONSTIPATION: ICD-10-CM

## 2018-01-31 NOTE — TELEPHONE ENCOUNTER
Medicare Part B will not cover 25 strips. Only multiples of 100.      Requested Prescriptions     Pending Prescriptions Disp Refills    glucose blood VI test strips (ONETOUCH VERIO) strip 100 Strip 11     Sig: Tests once a day E11.9

## 2018-01-31 NOTE — PROGRESS NOTES
Emerson Mendieta 5/17/1926, is a 80 y.o. female, who is seen today for reevaluation of constipation diabetes hypertension. She had called yesterday needing a different type of blood glucose monitoring machine and supplies. Those were sent to her pharmacy but she says that it is too hard to use. She wants to go back to her old one. She has not been checking her sugar now for a few weeks at least.  She continues to be constipated and was using milk of magnesia every night that helps her but she gets tired of using that. It works better than anything else she is used. She is taking all of her medicine correctly and feeling otherwise quite well. 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 Max Daily Amount: 1 mg. 60 Tab 3    meclizine (ANTIVERT) 25 mg tablet Take 1 Tab by mouth three (3) times daily as needed. Indications: Vertigo 60 Tab 1    simvastatin (ZOCOR) 80 mg tablet TAKE 1 TABLET BY MOUTH DAILY 90 Tab 0    metFORMIN ER (GLUCOPHAGE XR) 500 mg tablet TAKE 1 TABLET BY MOUTH EVERY DAY IN THE MORNING WITH BREAKFAST 30 Tab 2    metFORMIN ER (GLUCOPHAGE XR) 500 mg tablet TAKE 1 TABLET BY MOUTH EVERY DAY IN THE MORNING WITH BREAKFAST 90 Tab 2    gabapentin (NEURONTIN) 100 mg capsule Take 1 Cap by mouth three (3) times daily. 270 Cap 3    ADULT ASPIRIN EC LOW STRENGTH PO Take 81 mg by mouth daily.        Visit Vitals    /70    Pulse 81    Temp 98.3 °F (36.8 °C) (Oral)    Resp 12    Ht 4' 11\" (1.499 m)    Wt 149 lb 6.4 oz (67.8 kg)    SpO2 97%    BMI 30.18 kg/m2     Carotids are 2+ without bruits. Lungs are clear to percussion. Good breath sounds with no wheezing or crackles. Heart reveals a regular rhythm with normal S1 and S2 no murmur gallop click or rub. Abdomen is soft and nontender with no hepatosplenomegaly or masses and no bruits. Extremities reveal no clubbing cyanosis or edema. Pulses are 2+ throughout. Assessment: #1. Chronic constipation. I recommended she use milk of magnesia every night since this works so well, there is no reason to switch to something else such as MiraLAX. #2.  Diabetes has been well controlled and I have tried to convince her today that she really should not be checking her glucose at home, A1c has been good here and we can continue to check that every few months as she continues on metformin. I tried to explain to her again today that with metformin her glucose will not go below normal and has not been going very high. #3. Hypertension blood pressure doing very well. She will continue current no added salt diet. #4.  Anxiety is doing fairly well. She will continue clonazepam.    Follow-up as previously planned about 2 months from now and we will do lab at that time, lipids glucose A1c. Patrick Hawkins MD FACP    Please note: This document has been produced using voice recognition software. Unrecognized errors in transcription may be present.

## 2018-01-31 NOTE — MR AVS SNAPSHOT
[facility] 
 
 
 [address], [facility] [address] [facility] 
[phone] Patient: Vargas Alexis MRN: [id number]:[id number] Visit Information Date & Time Provider Department Dept. Phone Encounter #  
 1/31/2018  2:30 PM Pro Stafford MD [facility] [phone] [id number] Follow-up Instructions Follow-up and Disposition History Your Appointments 3/21/2018  8:30 AM  
Office Visit with Pro Stafford MD  
[facility] Appt Note: 4 week f/u per RM will do lab when she is here for a morning appointment [address], [facility] [address] [place]  
  
   
 [address] [facility] [address] Upcoming Health Maintenance Date Due DTaP/Tdap/Td series (1 - Tdap) 5/17/1947 MEDICARE YEARLY EXAM 11/16/2016 EYE EXAM RETINAL OR DILATED Q1 9/28/2017 HEMOGLOBIN A1C Q6M 5/21/2018 FOOT EXAM Q1 7/24/2018 MICROALBUMIN Q1 7/24/2018 GLAUCOMA SCREENING Q2Y 9/28/2018 LIPID PANEL Q1 11/21/2018 Allergies as of 1/31/2018  Review Complete On: 1/31/2018 By: Pro Stafford MD  
 No Known Allergies Current Immunizations  Reviewed on 11/21/2016 Name Date Influenza High Dose Vaccine PF 10/5/2016  9:54 AM  
 Influenza Vaccine 9/20/2014 Pneumococcal Conjugate (PCV-13) 11/21/2016 11:46 AM  
 Pneumococcal Polysaccharide (PPSV-23) 11/17/2015 Not reviewed this visit You Were Diagnosed With   
  
 Codes Comments Type 2 diabetes mellitus without complication, without long-term current use of insulin (HCC)    -  Primary ICD-10-CM: E11.9 ICD-9-CM: 250.00 Essential hypertension with goal blood pressure less than 140/90     ICD-10-CM: I10 
ICD-9-CM: 401.9 Chronic constipation     ICD-10-CM: K59.09 
ICD-9-CM: 564.00 Vitals BP Pulse Temp Resp Height(growth percentile) Weight(growth percentile) 118/70 81 98.3 °F (36.8 °C) (Oral) 12 4' 11\" (1.499 m) 149 lb 6.4 oz (67.8 kg) SpO2 BMI OB Status Smoking Status 97% 30.18 kg/m2 Postmenopausal Never Smoker Vitals History BMI and BSA Data Body Mass Index Body Surface Area  
 30.18 kg/m 2 1.68 m 2 Preferred Pharmacy Pharmacy Name Phone [id number]- [facility]ota [address] [phone] Your Updated Medication List  
  
   
This list is accurate as of: 1/31/18  2:58 PM.  Always use your most recent med list.  
  
  
  
  
 ADULT ASPIRIN EC LOW STRENGTH PO Take 81 mg by mouth daily. clonazePAM 0.5 mg tablet Commonly known as:  Abbey Dotter Take 1 Tab by mouth two (2) times a day. Max Daily Amount: 1 mg.  
  
 gabapentin 100 mg capsule Commonly known as:  NEURONTIN Take 1 Cap by mouth three (3) times daily. lancets 30 gauge Misc Commonly known as:  Creasie Honer LANCETS Tests once a day E11.9  
  
 meclizine 25 mg tablet Commonly known as:  ANTIVERT Take 1 Tab by mouth three (3) times daily as needed. Indications: Vertigo * metFORMIN  mg tablet Commonly known as:  GLUCOPHAGE XR  
TAKE 1 TABLET BY MOUTH EVERY DAY IN THE MORNING WITH BREAKFAST * metFORMIN  mg tablet Commonly known as:  GLUCOPHAGE XR  
TAKE 1 TABLET BY MOUTH EVERY DAY IN THE MORNING WITH BREAKFAST  
  
 simvastatin 80 mg tablet Commonly known as:  ZOCOR  
TAKE 1 TABLET BY MOUTH DAILY * Notice: This list has 2 medication(s) that are the same as other medications prescribed for you. Read the directions carefully, and ask your doctor or other care provider to review them with you. Introducing [facility] & HEALTH SERVICES! Ganga Schwartz introduces [employer]ious patient portal. Now you can access parts of your medical record, email your doctor's office, and request medication refills online. 1. In your internet browser, go to [email or web address] 2. Click on the First Time User? Click Here link in the Sign In box. You will see the New Member Sign Up page. 3. Enter your Vidyard Access Code exactly as it appears below. You will not need to use this code after youve completed the sign-up process. If you do not sign up before the expiration date, you must request a new code. · Vidyard Access Code: [email or web address] Expires: 2/19/2018  8:46 AM 
 
4. Enter the last four digits of your Social Security Number (xxxx) and Date of Birth (mm/dd/yyyy) as indicated and click Submit. You will be taken to the next sign-up page. 5. Create a Mundit ID. This will be your Vidyard login ID and cannot be changed, so think of one that is secure and easy to remember. 6. Create a Vidyard password. You can change your password at any time. 7. Enter your Password Reset Question and Answer. This can be used at a later time if you forget your password. 8. Enter your e-mail address. You will receive e-mail notification when new information is available in [address] 9. Click Sign Up. You can now view and download portions of your medical record. 10. Click the Download Summary menu link to download a portable copy of your medical information. If you have questions, please visit the Frequently Asked Questions section of the Vidyard website. Remember, Vidyard is NOT to be used for urgent needs. For medical emergencies, dial 911. Now available from your iPhone and Android! Please provide this summary of care documentation to your next provider. Your primary care clinician is listed as Areli Hampton. If you have any questions after today's visit, please call [phone].

## 2018-02-18 RX ORDER — GABAPENTIN 100 MG/1
CAPSULE ORAL
Qty: 270 CAP | Refills: 2 | Status: SHIPPED | OUTPATIENT
Start: 2018-02-18 | End: 2018-08-08 | Stop reason: SDUPTHER

## 2018-03-12 RX ORDER — MECLIZINE HYDROCHLORIDE 25 MG/1
TABLET ORAL
Qty: 60 TAB | Refills: 1 | Status: SHIPPED | OUTPATIENT
Start: 2018-03-12 | End: 2018-05-16 | Stop reason: SDUPTHER

## 2018-03-23 RX ORDER — METFORMIN HYDROCHLORIDE 500 MG/1
500 TABLET, EXTENDED RELEASE ORAL
Qty: 90 TAB | Refills: 1 | Status: SHIPPED | OUTPATIENT
Start: 2018-03-23 | End: 2018-03-26 | Stop reason: SDUPTHER

## 2018-03-23 NOTE — TELEPHONE ENCOUNTER
Insurance requires a minimum fill for 90 days. If appropriate, please sign pended medication order. If not, please notify me. Last Visit: 01/31/2018 with MD Malathi Pitt    Next Appointment: 03/26/2018 with MD Malathi Pitt   Previous Refill Encounters: 12/04/2017 per MD Malathi Pitt #30 with 2 refills     Requested Prescriptions     Pending Prescriptions Disp Refills    metFORMIN ER (GLUCOPHAGE XR) 500 mg tablet 90 Tab 1     Sig: Take 1 Tab by mouth daily (with breakfast).

## 2018-03-26 ENCOUNTER — HOSPITAL ENCOUNTER (OUTPATIENT)
Dept: LAB | Age: 83
Discharge: HOME | End: 2018-03-26
Payer: MEDICARE

## 2018-03-26 ENCOUNTER — OFFICE VISIT (OUTPATIENT)
Dept: INTERNAL MEDICINE CLINIC | Age: 83
End: 2018-03-26

## 2018-03-26 VITALS
OXYGEN SATURATION: 96 % | RESPIRATION RATE: 12 BRPM | SYSTOLIC BLOOD PRESSURE: 126 MMHG | TEMPERATURE: 98.7 F | BODY MASS INDEX: 30.92 KG/M2 | WEIGHT: 153.4 LBS | DIASTOLIC BLOOD PRESSURE: 76 MMHG | HEIGHT: 59 IN | HEART RATE: 73 BPM

## 2018-03-26 DIAGNOSIS — K59.09 CHRONIC CONSTIPATION: ICD-10-CM

## 2018-03-26 DIAGNOSIS — I10 ESSENTIAL HYPERTENSION WITH GOAL BLOOD PRESSURE LESS THAN 140/90: ICD-10-CM

## 2018-03-26 DIAGNOSIS — E11.9 TYPE 2 DIABETES MELLITUS WITHOUT COMPLICATION, WITHOUT LONG-TERM CURRENT USE OF INSULIN (HCC): Primary | ICD-10-CM

## 2018-03-26 DIAGNOSIS — E78.5 HYPERLIPIDEMIA LDL GOAL <100: ICD-10-CM

## 2018-03-26 DIAGNOSIS — E11.9 TYPE 2 DIABETES MELLITUS WITHOUT COMPLICATION, WITHOUT LONG-TERM CURRENT USE OF INSULIN (HCC): ICD-10-CM

## 2018-03-26 LAB
ALBUMIN SERPL-MCNC: 3.8 G/DL (ref 3.4–5)
ALBUMIN/GLOB SERPL: 1.2 {RATIO} (ref 0.8–1.7)
ALP SERPL-CCNC: 82 U/L (ref 45–117)
ALT SERPL-CCNC: 24 U/L (ref 13–56)
ANION GAP SERPL CALC-SCNC: 10 MMOL/L (ref 3–18)
AST SERPL-CCNC: 20 U/L (ref 15–37)
BILIRUB SERPL-MCNC: 0.4 MG/DL (ref 0.2–1)
BUN SERPL-MCNC: 14 MG/DL (ref 7–18)
BUN/CREAT SERPL: 15 (ref 12–20)
CALCIUM SERPL-MCNC: 8.8 MG/DL (ref 8.5–10.1)
CHLORIDE SERPL-SCNC: 108 MMOL/L (ref 100–108)
CHOLEST SERPL-MCNC: 94 MG/DL
CO2 SERPL-SCNC: 24 MMOL/L (ref 21–32)
CREAT SERPL-MCNC: 0.93 MG/DL (ref 0.6–1.3)
EST. AVERAGE GLUCOSE BLD GHB EST-MCNC: 171 MG/DL
GLOBULIN SER CALC-MCNC: 3.3 G/DL (ref 2–4)
GLUCOSE SERPL-MCNC: 166 MG/DL (ref 74–99)
HBA1C MFR BLD: 7.6 % (ref 4.2–5.6)
HDLC SERPL-MCNC: 44 MG/DL (ref 40–60)
HDLC SERPL: 2.1 {RATIO} (ref 0–5)
LDLC SERPL CALC-MCNC: 14.6 MG/DL (ref 0–100)
LIPID PROFILE,FLP: ABNORMAL
POTASSIUM SERPL-SCNC: 4 MMOL/L (ref 3.5–5.5)
PROT SERPL-MCNC: 7.1 G/DL (ref 6.4–8.2)
SODIUM SERPL-SCNC: 142 MMOL/L (ref 136–145)
TRIGL SERPL-MCNC: 177 MG/DL (ref ?–150)
VLDLC SERPL CALC-MCNC: 35.4 MG/DL

## 2018-03-26 PROCEDURE — 83036 HEMOGLOBIN GLYCOSYLATED A1C: CPT | Performed by: INTERNAL MEDICINE

## 2018-03-26 PROCEDURE — 80061 LIPID PANEL: CPT | Performed by: INTERNAL MEDICINE

## 2018-03-26 PROCEDURE — 80053 COMPREHEN METABOLIC PANEL: CPT | Performed by: INTERNAL MEDICINE

## 2018-03-26 RX ORDER — ADHESIVE BANDAGE
30 BANDAGE TOPICAL DAILY PRN
COMMUNITY

## 2018-03-26 NOTE — MR AVS SNAPSHOT
[facility] 
 
 
 [address] 
[phone] Patient: Brea Walters MRN: [id number] LCU:8[birth year] Visit Information Date & Time Provider Department Dept. Phone Encounter #  
 3/26/2018  3:00 PM Jaun Wolf MD [facility] [address] [place] [address] [phone] Follow-up Instructions Return in about 3 months (around 6/26/2018). Follow-up and Disposition History Upcoming Health Maintenance Date Due DTaP/Tdap/Td series (1 - Tdap) 5/17/1947 EYE EXAM RETINAL OR DILATED Q1 9/28/2017 MEDICARE YEARLY EXAM 3/14/2018 HEMOGLOBIN A1C Q6M 5/21/2018 FOOT EXAM Q1 7/24/2018 MICROALBUMIN Q1 7/24/2018 GLAUCOMA SCREENING Q2Y 9/28/2018 LIPID PANEL Q1 11/21/2018 Allergies as of 3/26/2018  Review Complete On: 3/26/2018 By: Jaun Wolf MD  
 No Known Allergies Current Immunizations  Reviewed on 11/21/2016 Name Date Influenza High Dose Vaccine PF 10/5/2016  9:54 AM  
 Influenza Vaccine 9/20/2014 Pneumococcal Conjugate (PCV-13) 11/21/2016 11:46 AM  
 Pneumococcal Polysaccharide (PPSV-23) 11/17/2015 Not reviewed this visit You Were Diagnosed With   
  
 Codes Comments Type 2 diabetes mellitus without complication, without long-term current use of insulin (HCC)    -  Primary ICD-10-CM: E11.9 ICD-9-CM: 250.00 Chronic constipation     ICD-10-CM: K59.09 
ICD-9-CM: 564.00 Hyperlipidemia LDL goal <100     ICD-10-CM: E78.5 ICD-9-CM: 272.4 Essential hypertension with goal blood pressure less than 140/90     ICD-10-CM: I10 
ICD-9-CM: 401.9 Vitals BP Pulse Temp Resp Height(growth percentile) Weight(growth percentile) 126/76 (BP 1 Location: Left arm, BP Patient Position: Sitting) 73 98.7 °F (37.1 °C) (Oral) 12 4' 11\" (1.499 m) 153 lb 6.4 oz (69.6 kg) SpO2 BMI OB Status Smoking Status 96% 30.98 kg/m2 Postmenopausal Never Smoker Vitals History BMI and BSA Data Body Mass Index Body Surface Area 30.98 kg/m 2 1.7 m 2 [facility]erred [id number] [phone] Your Updated Medication List  
  
   
This list is accurate as of 3/26/18  3:11 PM.  Always use your most recent med list.  
  
  
  
  
 ADULT ASPIRIN EC LOW STRENGTH PO Take 81 mg by mouth daily. clonazePAM 0.5 mg tablet Commonly known as:  Roanna David Take 1 Tab by mouth two (2) times a day. Max Daily Amount: 1 mg.  
  
 gabapentin 100 mg capsule Commonly known as:  NEURONTIN  
TAKE 1 CAPSULE BY MOUTH THREE (3) TIMES DAILY. lancets 30 gauge Misc Commonly known as:  Monet Call LANCETS Tests once a day E11.9  
  
 meclizine 25 mg tablet Commonly known as:  ANTIVERT  
TAKE 1 TAB BY MOUTH 3 TIMES DAILY AS NEEDED  
  
 metFORMIN  mg tablet Commonly known as:  GLUCOPHAGE XR  
TAKE 1 TABLET BY MOUTH EVERY DAY IN THE MORNING WITH BREAKFAST KATE MILK OF MAGNESIA 400 mg/5 mL suspension Generic drug:  magnesium hydroxide Take 30 mL by mouth daily as needed for Constipation. simvastatin 80 mg tablet Commonly known as:  ZOCOR  
TAKE 1 TABLET BY MOUTH DAILY Follow-up Instructions Return in about 3 months (around 6/26/2018). To-Do List   
 Around 03/26/2018 Lab:  HEMOGLOBIN A1C WITH EAG Around 03/26/2018 Lab:  LIPID PANEL Around 03/26/2018 Lab:  METABOLIC PANEL, COMPREHENSIVE Introducing [facility] & HEALTH SERVICES! [employer] introduces [employer] patient portal. Now you can access parts of your medical record, email your doctor's office, and request medication refills online. 1. In your internet browser, go to [email or web address] 2. Click on the First Time User? Click Here link in the Sign In box. You will see the New Member Sign Up page. 3. Enter your SpinMedia Group Access Code exactly as it appears below. You will not need to use this code after youve completed the sign-up process. If you do not sign up before the expiration date, you must request a new code. · Spin[employer] Access Code: [email or web address] Expires: 6/24/2018  2:10 PM 
 
4. Enter the last four digits of your Social Security Number (xxxx) and Date of Birth (mm/dd/yyyy) as indicated and click Submit. You will be taken to the next sign-up page. 5. Create a SpinMedia Group ID. This will be your Spin[employer] Group login ID and cannot be changed, so think of one that is secure and easy to remember. 6. Create a Spin[employer] Group password. You can change your password at any time. 7. Enter your Password Reset Question and Answer. This can be used at a later time if you forget your password. 8. Enter your e-mail address. You will receive e-mail notification when new information is available in [address] 9. Click Sign Up. You can now view and download portions of your medical record. 10. Click the Download Summary menu link to download a portable copy of your medical information. If you have questions, please visit the Frequently Asked Questions section of the Spin[employer] website. Remember, [employer] is NOT to be used for urgent needs. For medical emergencies, dial 911. Now available from your iPhone and Android! Please provide this summary of care documentation to your next provider. Your primary care clinician is listed as Areli Hampton. If you have any questions after today's visit, please call [phone].

## 2018-03-26 NOTE — PROGRESS NOTES
Sahra Bhatia 5/17/1926, is a 80 y.o. female, who is seen today for reevaluation diabetes hypertension hyperlipidemia chronic constipation anxiety. She feels pretty well and is taking all of her medicine correctly. She eats a healthy diet but is disappointed her weight is higher than it has ever been. Bowels are working well she continues milk of magnesia. 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     Essential hypertension, benign     Stable     Hypercholesterolemia     Hypertension     Other and unspecified hyperlipidemia     Per PMD/ on Zocor    Type II or unspecified type diabetes mellitus without mention of complication, not stated as uncontrolled      Current Outpatient Prescriptions   Medication Sig Dispense Refill    magnesium hydroxide (KATE MILK OF MAGNESIA) 400 mg/5 mL suspension Take 30 mL by mouth daily as needed for Constipation.  meclizine (ANTIVERT) 25 mg tablet TAKE 1 TAB BY MOUTH 3 TIMES DAILY AS NEEDED 60 Tab 1    gabapentin (NEURONTIN) 100 mg capsule TAKE 1 CAPSULE BY MOUTH THREE (3) TIMES DAILY. 270 Cap 2    clonazePAM (KLONOPIN) 0.5 mg tablet Take 1 Tab by mouth two (2) times a day. Max Daily Amount: 1 mg. 60 Tab 3    simvastatin (ZOCOR) 80 mg tablet TAKE 1 TABLET BY MOUTH DAILY 90 Tab 0    metFORMIN ER (GLUCOPHAGE XR) 500 mg tablet TAKE 1 TABLET BY MOUTH EVERY DAY IN THE MORNING WITH BREAKFAST 90 Tab 2    ADULT ASPIRIN EC LOW STRENGTH PO Take 81 mg by mouth daily.  lancets (ONETOUCH DELICA LANCETS) 30 gauge misc Tests once a day E11.9 100 Lancet 3     Visit Vitals    /76 (BP 1 Location: Left arm, BP Patient Position: Sitting)    Pulse 73    Temp 98.7 °F (37.1 °C) (Oral)    Resp 12    Ht 4' 11\" (1.499 m)    Wt 153 lb 6.4 oz (69.6 kg)    SpO2 96%    BMI 30.98 kg/m2     Carotids are 2+ without bruits. Lungs are clear to percussion. Good breath sounds with no wheezing or crackles.   Heart reveals a regular rhythm with normal S1 and S2 no murmur gallop click or rub. Apical impulse is not palpable. Abdomen soft nontender with no hepatosplenomegaly or masses and no bruits. Extremities reveal no clubbing cyanosis or edema. Pulses are intact throughout. Assessment: 1. Diabetes has been doing well. She will continue metformin  mg each morning and we will check lab today. #2.  Chronic constipation improved. She will continue milk of magnesia daily. #3. Hyperlipidemia has been doing well. She will continue simvastatin 80 mg each evening. #4.  Mild obesity with body mass index up to 31. She is going to be working on gradual weight loss in the coming months. #5. Anxiety doing well. She will continue clonazepam 0.5 mg twice a day as needed. We will check lab today and call results to her when available within the next 1-2 days. Follow-up in 3 months. Patrick Johnson MD FACP    Please note: This document has been produced using voice recognition software. Unrecognized errors in transcription may be present.

## 2018-03-26 NOTE — PROGRESS NOTES
Chief Complaint   Patient presents with    Diabetes     4 week follow up. Patient has concerns about her blood glucose, but doesn't check it at home. ROOM 10       Health Maintenance Due   Topic Date Due    DTaP/Tdap/Td series (1 - Tdap) 05/17/1947    EYE EXAM RETINAL OR DILATED Q1  09/28/2017    MEDICARE YEARLY EXAM  03/14/2018     Patient states she went to Dr. Jolan Duane about a month ago for an eye exam.    1. Have you been to the ER, urgent care clinic or hospitalized since your last visit? NO.     2. Have you seen or consulted any other health care providers outside of the [address] [place] [address] since your last visit (Include any pap smears or colon screening)?  YES

## 2018-03-27 NOTE — PROGRESS NOTES
Please notify the patient that her lab looks good, glucose is adequately controlled on current medication

## 2018-03-27 NOTE — TELEPHONE ENCOUNTER
----- Message from French Parish MD sent at 3/27/2018  7:47 AM EDT -----  Please notify the patient that her lab looks good, glucose is adequately controlled on current medication

## 2018-03-28 RX ORDER — LANCETS
EACH MISCELLANEOUS
Qty: 300 EACH | Refills: 3 | Status: SHIPPED | OUTPATIENT
Start: 2018-03-28 | End: 2018-08-14 | Stop reason: ALTCHOICE

## 2018-03-28 RX ORDER — INSULIN PUMP SYRINGE, 3 ML
EACH MISCELLANEOUS
Qty: 1 KIT | Refills: 0 | Status: SHIPPED | OUTPATIENT
Start: 2018-03-28 | End: 2018-08-14 | Stop reason: ALTCHOICE

## 2018-03-28 NOTE — TELEPHONE ENCOUNTER
Spoke with patient, I let her know that her a1c did go up but overall it seems her blood sugar is averaging a little higher than it was in November.  She would like a glucometer to check her blood sugar

## 2018-04-02 ENCOUNTER — TELEPHONE (OUTPATIENT)
Dept: INTERNAL MEDICINE CLINIC | Age: 83
End: 2018-04-02

## 2018-04-02 NOTE — TELEPHONE ENCOUNTER
Pt. Son advised of message below will check to see how many ounces of the milk of magnesia patient is taking. Wanted to know if it would be ok to take mineral oil?     Pls Advise

## 2018-04-02 NOTE — TELEPHONE ENCOUNTER
Pt's son called and stated tht pt has been constipated for quite some time, she mentioned it at her last ov and was told to take milk of magnesia, he said tht it hasn't gotten any better and tht pt said she doesn't remember when she has a good bowel movement, pls adv, RM

## 2018-04-03 NOTE — TELEPHONE ENCOUNTER
Called and spoke to patient son about the below message. Patient verbalized understanding with no additional questions. Cristobal Montilla MD   [facility] 20 hours ago (4:45 PM)                 Milk of magnesia as much more likely to work, I do not recommend mineral oil. (Routing comment)                Cristobal Montilla MD   [facility] Yesterday (12:36 PM)                   I would recommend using 4 ounces of milk of magnesia today, and each day until the bowels start working and then cut back to just 1 ounce per day.  (Routing comment)

## 2018-04-09 RX ORDER — SIMVASTATIN 80 MG/1
TABLET, FILM COATED ORAL
Qty: 90 TAB | Refills: 0 | Status: SHIPPED | OUTPATIENT
Start: 2018-04-09 | End: 2018-07-12 | Stop reason: SDUPTHER

## 2018-04-26 DIAGNOSIS — F41.9 ANXIETY: ICD-10-CM

## 2018-04-26 RX ORDER — CLONAZEPAM 0.5 MG/1
TABLET ORAL
Qty: 60 TAB | Refills: 1 | OUTPATIENT
Start: 2018-04-26 | End: 2018-08-27

## 2018-04-27 ENCOUNTER — TELEPHONE (OUTPATIENT)
Dept: INTERNAL MEDICINE CLINIC | Age: 83
End: 2018-04-27

## 2018-04-27 NOTE — TELEPHONE ENCOUNTER
I don't even know how to do this,usually the insurance companies know the issues with the patients or give the patients a form to fill out by their MD

## 2018-04-27 NOTE — TELEPHONE ENCOUNTER
Patient is asking for us to call Medicare and [facility] and ask that they will recognize her as a diabetic patient and ask that they provide her with the services they provide at no cost to her. Stated that her niece can answer any questions we may have.   Elwanda Kehr [phone]

## 2018-04-30 NOTE — TELEPHONE ENCOUNTER
Gave the patient the information below. She says we are wrong. We need to contact insurance for her. Wants to speak to a nurse or someone who can help her get her supplies. Wants a nurse to call her.     NN-Are you able to assist?

## 2018-05-14 ENCOUNTER — TELEPHONE (OUTPATIENT)
Dept: INTERNAL MEDICINE CLINIC | Age: 83
End: 2018-05-14

## 2018-05-14 NOTE — TELEPHONE ENCOUNTER
Pt calling again asking Dr. Luke Padgett to call her to discuss her current physical condition and her diabetes. She is refusing appt until someone calls her back. She will not give me any details because she says I am not a dr or a nurse.

## 2018-05-14 NOTE — TELEPHONE ENCOUNTER
Pt calling asking to speak to Dr. Jennifer Valadez nurse. Says she needs to discuss her diabetic supplies. Says she only needs to talk to a nurse and not a front office person.

## 2018-05-16 RX ORDER — MECLIZINE HYDROCHLORIDE 25 MG/1
TABLET ORAL
Qty: 60 TAB | Refills: 1 | Status: SHIPPED | OUTPATIENT
Start: 2018-05-16 | End: 2018-07-10 | Stop reason: SDUPTHER

## 2018-05-29 ENCOUNTER — TELEPHONE (OUTPATIENT)
Dept: INTERNAL MEDICINE CLINIC | Age: 83
End: 2018-05-29

## 2018-06-01 RX ORDER — METFORMIN HYDROCHLORIDE 500 MG/1
500 TABLET, EXTENDED RELEASE ORAL
Qty: 90 TAB | Refills: 2 | Status: SHIPPED | OUTPATIENT
Start: 2018-06-01 | End: 2019-05-23 | Stop reason: SDUPTHER

## 2018-06-01 NOTE — TELEPHONE ENCOUNTER
Last Visit: 03/26/2018 with MD Nic Snider    Next Appointment: 06/25/2018 with MD Nic Snider   Previous Refill Encounters: 08/07/2017 per MD Nic Snider #90 with 2 refills     Requested Prescriptions     Pending Prescriptions Disp Refills    metFORMIN ER (GLUCOPHAGE XR) 500 mg tablet 90 Tab 2     Sig: Take 1 Tab by mouth daily (with breakfast).

## 2018-06-18 ENCOUNTER — OFFICE VISIT (OUTPATIENT)
Dept: INTERNAL MEDICINE CLINIC | Age: 83
End: 2018-06-18

## 2018-06-18 VITALS
SYSTOLIC BLOOD PRESSURE: 138 MMHG | BODY MASS INDEX: 31.65 KG/M2 | RESPIRATION RATE: 12 BRPM | OXYGEN SATURATION: 98 % | WEIGHT: 157 LBS | TEMPERATURE: 98.2 F | DIASTOLIC BLOOD PRESSURE: 66 MMHG | HEIGHT: 59 IN | HEART RATE: 94 BPM

## 2018-06-18 DIAGNOSIS — E55.9 HYPOVITAMINOSIS D: ICD-10-CM

## 2018-06-18 DIAGNOSIS — I10 ESSENTIAL HYPERTENSION WITH GOAL BLOOD PRESSURE LESS THAN 140/90: ICD-10-CM

## 2018-06-18 DIAGNOSIS — H81.10 BENIGN PAROXYSMAL VERTIGO, UNSPECIFIED LATERALITY: ICD-10-CM

## 2018-06-18 DIAGNOSIS — E78.5 HYPERLIPIDEMIA LDL GOAL <100: ICD-10-CM

## 2018-06-18 DIAGNOSIS — E11.9 TYPE 2 DIABETES MELLITUS WITHOUT COMPLICATION, WITHOUT LONG-TERM CURRENT USE OF INSULIN (HCC): Primary | ICD-10-CM

## 2018-06-18 NOTE — PROGRESS NOTES
Waldemar Silva 5/17/1926, is a 80 y.o. female, who is seen today for reevaluation chronic anxiety obesity hypertension hyperlipidemia diabetes. She thinks her nerves are doing a lot better and she would like to taper off clonazepam.  She is using clonazepam 2-3 times a day. She is following her diet and keeping her weight down the best she can. She avoid sweets in her diet. No chest pain or dyspnea. No recent dizziness but she has had recurrent vertigo numerous times over the last few years since I have been seeing her. 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     Essential hypertension, benign     Stable     Hypercholesterolemia     Hypertension     Other and unspecified hyperlipidemia     Per PMD/ on Zocor    Type II or unspecified type diabetes mellitus without mention of complication, not stated as uncontrolled      Current Outpatient Prescriptions   Medication Sig Dispense Refill    metFORMIN ER (GLUCOPHAGE XR) 500 mg tablet Take 1 Tab by mouth daily (with breakfast). 90 Tab 2    meclizine (ANTIVERT) 25 mg tablet TAKE 1 TAB BY MOUTH 3 TIMES DAILY AS NEEDED 60 Tab 1    clonazePAM (KLONOPIN) 0.5 mg tablet TAKE 1 TABLET BY MOUTH TWICE A DAY 60 Tab 1    simvastatin (ZOCOR) 80 mg tablet TAKE 1 TABLET BY MOUTH DAILY 90 Tab 0    Blood-Glucose Meter monitoring kit Pt checks blood sugar daily, Dx E11.9 1 Kit 0    glucose blood VI test strips (BLOOD GLUCOSE TEST) strip Pt checks blood sugar daily, Dx e11.9 300 Strip 1    Lancets misc Pt checks blood sugar daily Dx E11.9 300 Each 3    magnesium hydroxide (KATE MILK OF MAGNESIA) 400 mg/5 mL suspension Take 30 mL by mouth daily as needed for Constipation.  gabapentin (NEURONTIN) 100 mg capsule TAKE 1 CAPSULE BY MOUTH THREE (3) TIMES DAILY.  270 Cap 2    lancets (ONETOUCH DELICA LANCETS) 30 gauge misc Tests once a day E11.9 100 Lancet 3    ADULT ASPIRIN EC LOW STRENGTH PO Take 81 mg by mouth daily. Visit Vitals    /66    Pulse 94    Temp 98.2 °F (36.8 °C) (Oral)    Resp 12    Ht 4' 11\" (1.499 m)    Wt 157 lb (71.2 kg)    SpO2 98%    BMI 31.71 kg/m2     Carotids are 2+ without bruits. Lungs are clear to percussion. Good breath sounds with no wheezing or crackles. Heart reveals a regular rhythm with normal S1 and S2 no murmur gallop click or rub. Apical impulse is not palpable. Abdomen is soft and nontender with no hepatosplenomegaly or masses and no bruits. Extremities reveal no clubbing cyanosis or edema. Pulses are 2+ throughout. Assessment: #1. Hypertension is well controlled. She will continue no added salt diet. #2.  Diabetes has been doing well. She will continue diabetic diet and metformin 500 mg with breakfast.   #3. Hyperlipidemia has been doing well. She will continue atorvastatin 80 mg each evening. #4.  Chronic anxiety, she will try decreasing clonazepam to 0.5 mg twice a day for a week and then once a day for a week and then stop it and she will call me if she is having a lot of anxiety. She may well have recurrent anxiety. #5.  Paroxysmal vertigo, uncertain as to whether his right or left. She will use meclizine when needed. #6.  Mild obesity unchanged, encouraged her to work on gradual weight loss by cutting back on starches and eating more salads and vegetables. Follow-up in 2 months for complete evaluation    Patrick Park MD FACP    Please note: This document has been produced using voice recognition software. Unrecognized errors in transcription may be present.

## 2018-06-18 NOTE — MR AVS SNAPSHOT
[facility] 
 
 
 [address], [facility] [address] [facility] 
[phone] Patient: Елена Mendez MRN: [id number] Visit Information Date & Time Provider Department Dept. Phone Encounter #  
 6/18/2018  2:30 PM Kassandra St MD [facility] [phone] Follow-up Instructions Follow-up and Disposition History Your Appointments 8/7/2018 10:15 AM  
LAB with [facility] NURSE VISIT [facility] ([facility]) Appt Note: labs [address], [facility] [address] [place]  
  
   
 [address], [facility]  
  
    
 8/14/2018 10:30 AM  
PHYSICAL with Kassandra St MD  
[facility] [address]) Appt Note: [facility] rm  
 [address] [facility], [address] [place]  
  
   
 [address] On license of [facility] Upcoming Health Maintenance Date Due DTaP/Tdap/Td series (1 - Tdap) 5/17/1947 MEDICARE YEARLY EXAM 3/14/2018 FOOT EXAM Q1 7/24/2018 MICROALBUMIN Q1 7/24/2018 Influenza Age 5 to Adult 8/1/2018 HEMOGLOBIN A1C Q6M 9/26/2018 EYE EXAM RETINAL OR DILATED Q1 3/6/2019 LIPID PANEL Q1 3/26/2019 GLAUCOMA SCREENING Q2Y 3/6/2020 Allergies as of 6/18/2018  Review Complete On: 6/18/2018 By: Kassandra St MD  
 No Known Allergies Current Immunizations  Reviewed on 11/21/2016 Name Date Influenza High Dose Vaccine PF 10/5/2016  9:54 AM  
 Influenza Vaccine 9/20/2014 Pneumococcal Conjugate (PCV-13) 11/21/2016 11:46 AM  
 Pneumococcal Polysaccharide (PPSV-23) 11/17/2015 Not reviewed this visit You Were Diagnosed With   
  
 Codes Comments Type 2 diabetes mellitus without complication, without long-term current use of insulin (HCC)    -  Primary ICD-10-CM: E11.9 ICD-9-CM: 250.00 Hyperlipidemia LDL goal <100     ICD-10-CM: E78.5 ICD-9-CM: 272.4 Essential hypertension with goal blood pressure less than 140/90     ICD-10-CM: I10 
ICD-9-CM: 401.9 Benign paroxysmal vertigo, unspecified laterality     ICD-10-CM: H81.10 ICD-9-CM: 386.11 Hypovitaminosis D     ICD-10-CM: E55.9 ICD-9-CM: 268.9 Vitals BP Pulse Temp Resp Height(growth percentile) Weight(growth percentile)  
 138/66 94 98.2 °F (36.8 °C) (Oral) 12 4' 11\" (1.499 m) 157 lb (71.2 kg) SpO2 BMI OB Status Smoking Status 98% 31.71 kg/m2 Postmenopausal Never Smoker Vitals History BMI and BSA Data Body Mass Index Body Surface Area 31.71 kg/m 2 1.72 m 2 Preferred Pharmacy Pharmacy Name Phone [id number] [address] [phone] Your Updated Medication List  
  
   
This list is accurate as of 6/18/18  3:25 PM.  Always use your most recent med list.  
  
  
  
  
 ADULT ASPIRIN EC LOW STRENGTH PO Take 81 mg by mouth daily. Blood-Glucose Meter monitoring kit Pt checks blood sugar daily, Dx E11.9  
  
 clonazePAM 0.5 mg tablet Commonly known as:  KlonoPIN  
TAKE 1 TABLET BY MOUTH TWICE A DAY  
  
 gabapentin 100 mg capsule Commonly known as:  NEURONTIN  
TAKE 1 CAPSULE BY MOUTH THREE (3) TIMES DAILY. glucose blood VI test strips strip Commonly known as:  blood glucose test  
Pt checks blood sugar daily, Dx e11.9  
  
 * lancets 30 gauge Misc Commonly known as:  Patrick Manger LANCETS Tests once a day E11.9 * Lancets Misc Pt checks blood sugar daily Dx E11.9  
  
 meclizine 25 mg tablet Commonly known as:  ANTIVERT  
TAKE 1 TAB BY MOUTH 3 TIMES DAILY AS NEEDED  
  
 metFORMIN  mg tablet Commonly known as:  GLUCOPHAGE XR Take 1 Tab by mouth daily (with breakfast). KATE MILK OF MAGNESIA 400 mg/5 mL suspension Generic drug:  magnesium hydroxide Take 30 mL by mouth daily as needed for Constipation. simvastatin 80 mg tablet Commonly known as:  ZOCOR  
TAKE 1 TABLET BY MOUTH DAILY * Notice: This list has 2 medication(s) that are the same as other medications prescribed for you. Read the directions carefully, and ask your doctor or other care provider to review them with you. To-Do List   
 Around 08/23/2018 Lab:  CBC WITH AUTOMATED DIFF Around 08/23/2018 Lab:  HEMOGLOBIN A1C WITH EAG Around 08/23/2018 Lab:  LIPID PANEL Around 08/23/2018 Lab:  METABOLIC PANEL, COMPREHENSIVE Around 08/23/2018 Lab:  MICROALBUMIN, UR, RAND W/ MICROALB/CREAT RATIO Around 08/23/2018 Lab:  T4, FREE Around 08/23/2018 Lab:  TSH 3RD GENERATION Around 08/23/2018 Lab:  URINALYSIS W/ RFLX MICROSCOPIC Around 08/23/2018 Lab:  VITAMIN D, 25 HYDROXY Introducing [facility]! [facility] introduces HQ plus patient portal. Now you can access parts of your medical record, email your doctor's office, and request medication refills online. 1. In your internet browser, go to [email or web address] 2. Click on the First Time User? Click Here link in the Sign In box. You will see the New Member Sign Up page. 3. Enter your HQ plus Access Code exactly as it appears below. You will not need to use this code after youve completed the sign-up process. If you do not sign up before the expiration date, you must request a new code. · HQ plus Access Code: [email or web address] Expires: 6/24/2018  2:10 PM 
 
4. Enter the last four digits of your Social Security Number (xxxx) and Date of Birth (mm/dd/yyyy) as indicated and click Submit. You will be taken to the next sign-up page. 5. Create a HQ plus ID. This will be your HQ plus login ID and cannot be changed, so think of one that is secure and easy to remember. 6. Create a CDP password. You can change your password at any time. 7. Enter your Password Reset Question and Answer. This can be used at a later time if you forget your password. 8. Enter your e-mail address. You will receive e-mail notification when new information is available in [address] 9. Click Sign Up. You can now view and download portions of your medical record. 10. Click the Download Summary menu link to download a portable copy of your medical information. If you have questions, please visit the Frequently Asked Questions section of the CDP website. Remember, CDP is NOT to be used for urgent needs. For medical emergencies, dial 911. Now available from your iPhone and Android! Please provide this summary of care documentation to your next provider. Your primary care clinician is listed as Flynn Farley. Edu Lau. If you have any questions after today's visit, please call [phone].

## 2018-07-10 RX ORDER — MECLIZINE HYDROCHLORIDE 25 MG/1
25 TABLET ORAL
Qty: 60 TAB | Refills: 1 | Status: SHIPPED | OUTPATIENT
Start: 2018-07-10 | End: 2018-08-27

## 2018-07-10 NOTE — TELEPHONE ENCOUNTER
Last Visit: 06/18/2018 with MD Aye Bryant    Next Appointment: 08/14/2018 with MD Aye Bryant   Previous Refill Encounters: 05/16/2018 per MD Aye Bryant #60 with 1 refill     Requested Prescriptions     Pending Prescriptions Disp Refills    meclizine (ANTIVERT) 25 mg tablet 60 Tab 1     Sig: Take 1 Tab by mouth three (3) times daily as needed.

## 2018-07-12 NOTE — TELEPHONE ENCOUNTER
Last Visit: 06/18/2018 with MD Mara Douglas    Next Appointment: 08/14/2018 with MD Mara Douglas   Previous Refill Encounters: 04/09/2018 per MD Mara Douglas #90    Requested Prescriptions     Pending Prescriptions Disp Refills    simvastatin (ZOCOR) 80 mg tablet 90 Tab 0     Sig: Take 1 Tab by mouth daily.

## 2018-07-13 RX ORDER — SIMVASTATIN 80 MG/1
80 TABLET, FILM COATED ORAL DAILY
Qty: 90 TAB | Refills: 0 | Status: SHIPPED | OUTPATIENT
Start: 2018-07-13 | End: 2018-11-12 | Stop reason: SDUPTHER

## 2018-07-16 ENCOUNTER — TELEPHONE (OUTPATIENT)
Dept: INTERNAL MEDICINE CLINIC | Age: 83
End: 2018-07-16

## 2018-07-16 NOTE — TELEPHONE ENCOUNTER
Although she is compliant with the meclizine, she is still dizzy and now weaker. She wants to know if she needs to be taking something else. Please advise the patient.

## 2018-07-18 ENCOUNTER — TELEPHONE (OUTPATIENT)
Dept: INTERNAL MEDICINE CLINIC | Age: 83
End: 2018-07-18

## 2018-07-18 DIAGNOSIS — R42 DIZZINESS: Primary | ICD-10-CM

## 2018-07-18 NOTE — TELEPHONE ENCOUNTER
Pt's son called and stated tht he would like to spk to  or his nurse about pt's current situation with the dizziness and why she may be experiencing this. Patient and her son both called today, son called today she called yesterday and I sent a message back to her recommending that she stick with meclizine for dizziness. Her son called today but he was not available but she answered the phone and wants to know why she is dizzy. She is asked this question every time I have seen her for over 2 years. I suspect it is a deterioration of her inner ear, this likely acoustic neuroma, brainstem disease or cerebellar disease etc. etc. etc. she readily admits anxiety as a part of her problem but may not actually cause the dizziness. At this point I insisted that she see a neurologist for second opinion. I doubt that ENT has anything to offer.   I told her I did not think scopolamine would be any more helpful and might cause more side effects, visual disturbance etc. etc.

## 2018-07-24 ENCOUNTER — OFFICE VISIT (OUTPATIENT)
Dept: INTERNAL MEDICINE CLINIC | Age: 83
End: 2018-07-24

## 2018-07-24 VITALS
OXYGEN SATURATION: 98 % | HEART RATE: 80 BPM | HEIGHT: 59 IN | WEIGHT: 142.8 LBS | SYSTOLIC BLOOD PRESSURE: 136 MMHG | BODY MASS INDEX: 28.79 KG/M2 | RESPIRATION RATE: 12 BRPM | DIASTOLIC BLOOD PRESSURE: 74 MMHG | TEMPERATURE: 98.3 F

## 2018-07-24 DIAGNOSIS — H81.10 BENIGN PAROXYSMAL POSITIONAL VERTIGO, UNSPECIFIED LATERALITY: Primary | ICD-10-CM

## 2018-07-24 DIAGNOSIS — F41.9 CHRONIC ANXIETY: ICD-10-CM

## 2018-07-24 DIAGNOSIS — I10 ESSENTIAL HYPERTENSION WITH GOAL BLOOD PRESSURE LESS THAN 140/90: ICD-10-CM

## 2018-07-26 ENCOUNTER — TELEPHONE (OUTPATIENT)
Dept: INTERNAL MEDICINE CLINIC | Age: 83
End: 2018-07-26

## 2018-07-26 NOTE — TELEPHONE ENCOUNTER
Son calling asking to speak to a nurse. Says mother got letter that she is taking 2 medications for same problem. Says he knows one of the meds is Simvastatin.  Wants to discuss mothers med list.

## 2018-07-27 NOTE — TELEPHONE ENCOUNTER
I called and spoke with patient's son, went over her medication list with him. There's nothing on her medication list that is a duplicate or where she is using two different meds to treat the same problem. I explained to her son that certainly people can sometimes require multiple diabetic medications or multiple blood medications, but that is not the case here. She's on one cholesterol med- Zocor, and one diabetic med-Metformin. Answered his questions, instructed to call us back should he have any further questions.

## 2018-08-08 RX ORDER — GABAPENTIN 100 MG/1
100 CAPSULE ORAL 3 TIMES DAILY
Qty: 270 CAP | Refills: 2 | Status: SHIPPED | OUTPATIENT
Start: 2018-08-08 | End: 2018-12-05

## 2018-08-08 NOTE — TELEPHONE ENCOUNTER
Last Visit: 07/24/2018 with MD Jillian Yepez    Next Appointment: 08/14/2018 with MD Jillian Yepez   Previous Refill Encounters: 02/18/2018 per MD Jillian Yepez #270 with 2 refills     Requested Prescriptions     Pending Prescriptions Disp Refills    gabapentin (NEURONTIN) 100 mg capsule 270 Cap 2     Sig: Take 1 Cap by mouth three (3) times daily.

## 2018-08-14 ENCOUNTER — HOSPITAL ENCOUNTER (OUTPATIENT)
Dept: LAB | Age: 83
Discharge: HOME | End: 2018-08-14
Payer: MEDICARE

## 2018-08-14 ENCOUNTER — OFFICE VISIT (OUTPATIENT)
Dept: INTERNAL MEDICINE CLINIC | Age: 83
End: 2018-08-14

## 2018-08-14 VITALS
SYSTOLIC BLOOD PRESSURE: 130 MMHG | DIASTOLIC BLOOD PRESSURE: 72 MMHG | WEIGHT: 139.4 LBS | OXYGEN SATURATION: 98 % | BODY MASS INDEX: 28.1 KG/M2 | HEART RATE: 83 BPM | RESPIRATION RATE: 12 BRPM | TEMPERATURE: 98.3 F | HEIGHT: 59 IN

## 2018-08-14 DIAGNOSIS — Z13.31 SCREENING FOR DEPRESSION: ICD-10-CM

## 2018-08-14 DIAGNOSIS — I10 ESSENTIAL HYPERTENSION WITH GOAL BLOOD PRESSURE LESS THAN 140/90: ICD-10-CM

## 2018-08-14 DIAGNOSIS — E55.9 HYPOVITAMINOSIS D: ICD-10-CM

## 2018-08-14 DIAGNOSIS — E11.9 TYPE 2 DIABETES MELLITUS WITHOUT COMPLICATION, WITHOUT LONG-TERM CURRENT USE OF INSULIN (HCC): Primary | ICD-10-CM

## 2018-08-14 DIAGNOSIS — E11.9 TYPE 2 DIABETES MELLITUS WITHOUT COMPLICATION, WITHOUT LONG-TERM CURRENT USE OF INSULIN (HCC): ICD-10-CM

## 2018-08-14 DIAGNOSIS — H81.10 BENIGN PAROXYSMAL VERTIGO, UNSPECIFIED LATERALITY: ICD-10-CM

## 2018-08-14 DIAGNOSIS — E78.5 HYPERLIPIDEMIA LDL GOAL <100: ICD-10-CM

## 2018-08-14 DIAGNOSIS — Z00.00 MEDICARE ANNUAL WELLNESS VISIT, SUBSEQUENT: ICD-10-CM

## 2018-08-14 LAB
ALBUMIN SERPL-MCNC: 4.2 G/DL (ref 3.4–5)
ALBUMIN/GLOB SERPL: 1.1 {RATIO} (ref 0.8–1.7)
ALP SERPL-CCNC: 90 U/L (ref 45–117)
ALT SERPL-CCNC: 19 U/L (ref 13–56)
ANION GAP SERPL CALC-SCNC: 8 MMOL/L (ref 3–18)
APPEARANCE UR: CLEAR
AST SERPL-CCNC: 24 U/L (ref 15–37)
BACTERIA URNS QL MICRO: NEGATIVE /HPF
BASOPHILS # BLD: 0 K/UL (ref 0–0.1)
BASOPHILS NFR BLD: 1 % (ref 0–2)
BILIRUB SERPL-MCNC: 0.3 MG/DL (ref 0.2–1)
BILIRUB UR QL: NEGATIVE
BUN SERPL-MCNC: 10 MG/DL (ref 7–18)
BUN/CREAT SERPL: 11 (ref 12–20)
CALCIUM SERPL-MCNC: 9.4 MG/DL (ref 8.5–10.1)
CHLORIDE SERPL-SCNC: 110 MMOL/L (ref 100–108)
CHOLEST SERPL-MCNC: 111 MG/DL
CO2 SERPL-SCNC: 25 MMOL/L (ref 21–32)
COLOR UR: YELLOW
CREAT SERPL-MCNC: 0.95 MG/DL (ref 0.6–1.3)
DIFFERENTIAL METHOD BLD: ABNORMAL
EOSINOPHIL # BLD: 0.1 K/UL (ref 0–0.4)
EOSINOPHIL NFR BLD: 1 % (ref 0–5)
EPITH CASTS URNS QL MICRO: NORMAL /LPF (ref 0–5)
ERYTHROCYTE [DISTWIDTH] IN BLOOD BY AUTOMATED COUNT: 14.3 % (ref 11.6–14.5)
EST. AVERAGE GLUCOSE BLD GHB EST-MCNC: 148 MG/DL
GLOBULIN SER CALC-MCNC: 3.9 G/DL (ref 2–4)
GLUCOSE SERPL-MCNC: 113 MG/DL (ref 74–99)
GLUCOSE UR STRIP.AUTO-MCNC: NEGATIVE MG/DL
HBA1C MFR BLD: 6.8 % (ref 4.2–5.6)
HCT VFR BLD AUTO: 44.4 % (ref 35–45)
HDLC SERPL-MCNC: 55 MG/DL (ref 40–60)
HDLC SERPL: 2 {RATIO} (ref 0–5)
HGB BLD-MCNC: 14.7 G/DL (ref 12–16)
HGB UR QL STRIP: NEGATIVE
KETONES UR QL STRIP.AUTO: NEGATIVE MG/DL
LDLC SERPL CALC-MCNC: 44.2 MG/DL (ref 0–100)
LEUKOCYTE ESTERASE UR QL STRIP.AUTO: ABNORMAL
LIPID PROFILE,FLP: NORMAL
LYMPHOCYTES # BLD: 2.3 K/UL (ref 0.9–3.6)
LYMPHOCYTES NFR BLD: 39 % (ref 21–52)
MCH RBC QN AUTO: 27.4 PG (ref 24–34)
MCHC RBC AUTO-ENTMCNC: 33.1 G/DL (ref 31–37)
MCV RBC AUTO: 82.8 FL (ref 74–97)
MONOCYTES # BLD: 0.9 K/UL (ref 0.05–1.2)
MONOCYTES NFR BLD: 15 % (ref 3–10)
NEUTS SEG # BLD: 2.6 K/UL (ref 1.8–8)
NEUTS SEG NFR BLD: 44 % (ref 40–73)
NITRITE UR QL STRIP.AUTO: NEGATIVE
PH UR STRIP: 7 [PH] (ref 5–8)
PLATELET # BLD AUTO: 216 K/UL (ref 135–420)
PMV BLD AUTO: 11.9 FL (ref 9.2–11.8)
POTASSIUM SERPL-SCNC: 4.7 MMOL/L (ref 3.5–5.5)
PROT SERPL-MCNC: 8.1 G/DL (ref 6.4–8.2)
PROT UR STRIP-MCNC: NEGATIVE MG/DL
RBC # BLD AUTO: 5.36 M/UL (ref 4.2–5.3)
RBC #/AREA URNS HPF: 0 /HPF (ref 0–5)
SODIUM SERPL-SCNC: 143 MMOL/L (ref 136–145)
SP GR UR REFRACTOMETRY: 1.01 (ref 1–1.03)
T4 FREE SERPL-MCNC: 1.1 NG/DL (ref 0.7–1.5)
TRIGL SERPL-MCNC: 59 MG/DL (ref ?–150)
TSH SERPL DL<=0.05 MIU/L-ACNC: 0.52 UIU/ML (ref 0.36–3.74)
UROBILINOGEN UR QL STRIP.AUTO: 0.2 EU/DL (ref 0.2–1)
VLDLC SERPL CALC-MCNC: 11.8 MG/DL
WBC # BLD AUTO: 5.9 K/UL (ref 4.6–13.2)
WBC URNS QL MICRO: NORMAL /HPF (ref 0–4)

## 2018-08-14 PROCEDURE — 81001 URINALYSIS AUTO W/SCOPE: CPT | Performed by: INTERNAL MEDICINE

## 2018-08-14 PROCEDURE — 84443 ASSAY THYROID STIM HORMONE: CPT | Performed by: INTERNAL MEDICINE

## 2018-08-14 PROCEDURE — 82043 UR ALBUMIN QUANTITATIVE: CPT | Performed by: INTERNAL MEDICINE

## 2018-08-14 PROCEDURE — 80053 COMPREHEN METABOLIC PANEL: CPT | Performed by: INTERNAL MEDICINE

## 2018-08-14 PROCEDURE — 84439 ASSAY OF FREE THYROXINE: CPT | Performed by: INTERNAL MEDICINE

## 2018-08-14 PROCEDURE — 83036 HEMOGLOBIN GLYCOSYLATED A1C: CPT | Performed by: INTERNAL MEDICINE

## 2018-08-14 PROCEDURE — 82306 VITAMIN D 25 HYDROXY: CPT | Performed by: INTERNAL MEDICINE

## 2018-08-14 PROCEDURE — 80061 LIPID PANEL: CPT | Performed by: INTERNAL MEDICINE

## 2018-08-14 PROCEDURE — 85025 COMPLETE CBC W/AUTO DIFF WBC: CPT | Performed by: INTERNAL MEDICINE

## 2018-08-14 NOTE — MR AVS SNAPSHOT
[facility] 
 
 
 [address], [address] [place] [address] [facility] 
[phone] Patient: Sruthi Rosa MRN: [id number]:[id number] Visit Information Date & Time Provider Department Dept. Phone Encounter #  
 8/14/2018 10:30 AM Jayesh Higgins MD [facility] [address] [place] [address] [phone] Follow-up Instructions Return in about 4 months (around 12/14/2018). Your Appointments 8/27/2018  3:15 PM  
New Patient with Gonzalo Valdivia MD  
[address] at [address] [facility]) Appt Note: Dr. Davi Kemp and moriah fowler in cc  Pt Preferrs to see neurologist and not NP.  
 27 [facility] B-2 [facility] [address] 706 [facility] Upcoming Health Maintenance Date Due DTaP/Tdap/Td series (1 - Tdap) 5/17/1947 MEDICARE YEARLY EXAM 3/14/2018 MICROALBUMIN Q1 7/24/2018 Influenza Age 5 to Adult 8/1/2018 HEMOGLOBIN A1C Q6M 9/26/2018 EYE EXAM RETINAL OR DILATED Q1 3/6/2019 LIPID PANEL Q1 3/26/2019 FOOT EXAM Q1 8/14/2019 GLAUCOMA SCREENING Q2Y 3/6/2020 Allergies as of 8/14/2018  Review Complete On: 8/14/2018 By: Jayesh Higgins MD  
 No Known Allergies Current Immunizations  Reviewed on 11/21/2016 Name Date Influenza High Dose Vaccine PF 10/5/2016  9:54 AM  
 Influenza Vaccine 9/20/2014 Pneumococcal Conjugate (PCV-13) 11/21/2016 11:46 AM  
 Pneumococcal Polysaccharide (PPSV-23) 11/17/2015 Not reviewed this visit You Were Diagnosed With   
  
 Codes Comments Medicare annual wellness visit, subsequent     ICD-10-CM: Z00.00 ICD-9-CM: V70.0 Screening for depression     ICD-10-CM: Z13.89 ICD-9-CM: V79.0 Vitals BP Pulse Temp Resp Height(growth percentile) Weight(growth percentile) 130/72 83 98.3 °F (36.8 °C) (Oral) 12 4' 11\" (1.499 m) 139 lb 6.4 oz (63.2 kg) SpO2 BMI OB Status Smoking Status 98% 28.16 kg/m2 Postmenopausal Never Smoker Vitals History BMI and BSA Data Body Mass Index Body Surface Area  
 28.16 kg/m 2 1.62 m 2 Preferred Pharmacy Pharmacy Name Phone [facility] [place], [address] [phone] Your Updated Medication List  
  
   
This list is accurate as of 8/14/18 11:29 AM.  Always use your most recent med list.  
  
  
  
  
 ADULT ASPIRIN EC LOW STRENGTH PO Take 81 mg by mouth daily. clonazePAM 0.5 mg tablet Commonly known as:  KlonoPIN  
TAKE 1 TABLET BY MOUTH TWICE A DAY  
  
 gabapentin 100 mg capsule Commonly known as:  NEURONTIN Take 1 Cap by mouth three (3) times daily. meclizine 25 mg tablet Commonly known as:  ANTIVERT Take 1 Tab by mouth three (3) times daily as needed. metFORMIN  mg tablet Commonly known as:  GLUCOPHAGE XR Take 1 Tab by mouth daily (with breakfast). KATE MILK OF MAGNESIA 400 mg/5 mL suspension Generic drug:  magnesium hydroxide Take 30 mL by mouth daily as needed for Constipation. simvastatin 80 mg tablet Commonly known as:  ZOCOR Take 1 Tab by mouth daily. We Performed the Following Vaibhav  [[id number] [facility]] Follow-up Instructions Return in about 4 months (around 12/14/2018). Patient Instructions Medicare Part B Preventive Services Limitations Recommendation Follow-up Action Needed Bone Mass Measurement 
(age 72 & older, biennial) Requires diagnosis related to osteoporosis or estrogen deficiency. Biennial benefit unless patient has history of long-term glucocorticoid tx or baseline is needed because initial test was by other method Last: 3/2/12 A DEXA scan is recommended after you turn 72years of age to determine your risk for osteoporosis Next: Follow up as recommended by primary care provider and/or specialist    
 Colorectal Cancer Screening 
-Fecal occult blood test (annual) -Flexible sigmoidoscopy (5y) 
-Screening colonoscopy (10y) -Barium Enema Normally recommended for patients age 48 - 78  Last: N/A Every 5-10 years depending on your colonoscopy result starting at age 48 years Next: Follow up as recommended by primary care provider and/or specialist   
Glaucoma Screening (no USPSTF recommendation) Diabetes mellitus, family history, , age 48 or over,  American, age 72 or over Last: 3/6/18 Every year, or as directed by provider Next: Follow up as recommended by primary care provider and/or specialist or at least yearly for dilated eye exam  
Screening Mammography (biennial age 54-69) Annually (age 36 or over) Last: 4/11/17 Next: Follow up as recommended by primary care provider and/or specialist   
Screening Pap Tests and Pelvic Examination (up to age 72 and after 72 if unknown history or abnormal study last 10 years) Every 24 months except high risk Last: n/a  Next: Discuss with your doctor if this screening is appropriate for you. Cardiovascular Screening Blood Tests (every 5 years) Total cholesterol, HDL, Triglycerides Order as a panel if possible Last: 3/26/18 Every Year Next: Follow up as recommended by primary care provider and/or specialist   
Diabetes Screening Tests (at least every 3 years, Medicare covers annually or at 6-month intervals for prediabetic patients) Fasting blood sugar (FBS) or glucose tolerance test (GTT) Patient must be diagnosed with one of the following: 
-Hypertension, Dyslipidemia, obesity, previous impaired FBS or GTT 
Or any two of the following: overweight, FH of diabetes, age ? 72, history of gestational diabetes, birth of baby weighing more than 9 pounds Last: 3/26/18 Every 3-6 months depending on your result Every 3 years Next: Follow up as recommended by primary care provider and/or specialist   
 Diabetes Self-Management Training (DSMT) (no USPSTF recommendation) Requires referral by treating physician for patient with diabetes or renal disease. 10 hours of initial DSMT session of no less than 30 minutes each in a continuous 12-month period. 2 hours of follow-up DSMT in subsequent years. Last: N/A Talk to your doctor if you are interested in a refresher course. Medical Nutrition Therapy (MNT) (for diabetes or renal disease not recommended schedule) Requires referral by treating physician for patient with diabetes or renal disease. Can be provided in same year as diabetes self-management training (DSMT), and CMS recommends medical nutrition therapy take place after DSMT. Up to 3 hours for initial year and 2 hours in subsequent years. Last: N/A Talk to your doctor if you are interested in a refresher course. Seasonal Influenza Vaccination (annually)  Last:   
 
Every Fall Please consider getting one every fall Pneumococcal Vaccination (once after 65)  Last: 
Pneumovax: 11/17/15 Prevnar-13: 11/21/16 Next: Vaccination series complete Shingles Vaccination  Last: 12/2015 A shingles vaccine is recommended once in a lifetime after age 61 Shingrix available from your local pharmacy, CDC recommends getting this even if you have gotten the previous shingles vaccine Tetanus, Diphtheria and Pertussis (Tdap) Vaccination Booster One Booster as an adult and then tetanus every 10 years or as indicated Last: n/a  Please consider, especially if you will have frequent contact with young children who have not completed their vaccine series. Can get at your pharmacy. Hepatitis B Vaccinations (if medium/high risk) Medium/high risk factors:  End-stage renal disease, Hemophiliacs who received Factor VIII or IX concentrates, Clients of institutions for the mentally retarded, Persons who live in the same house as a HepB virus carrier, Homosexual men, Illicit injectable drug abusers.  Last: N/A Next: N/A  
 Counseling to Prevent Tobacco Use (up to 8 sessions per year) - Counseling greater than 3 and up to 10 minutes - Counseling greater than 10 minutes Patients must be asymptomatic of tobacco-related conditions to receive as preventive service  As recommended by your PCP or specialist   
Human Immunodeficiency Virus (HIV) Screening (annually for increased risk patients) HIV-1 and HIV-2 by EIA, LESTER, rapid antibody test, or oral mucosa transudate Patient must be at increased risk for HIV infection per USPSTF guidelines or pregnant. Tests covered annually for patients at increased risk. Pregnant patients may receive up to 3 test during pregnancy. As recommended by your PCP or Specialist  
Ultrasound Screening for Abdominal Aortic Aneurysm (AAA) once Patient must be referred through IPPE and not have had a screening for abdominal aortic aneurysm before under medicare. Limited to patients who meet onf of the following criteria: 
-Men who are 73-68 years old and have smoked more than 100 cigarettes in their lifetime 
-Anyone with a FH of AAA 
-Anyone recommended for screening by the USPSFTF As recommended by your PCP or Specialist 
  
NA = Not Applicable; NI= Not Indicated Advance Directives: Care Instructions Your Care Instructions An advance directive is a legal way to state your wishes at the end of your life. It tells your family and your doctor what to do if you can no longer say what you want. There are two main types of advance directives. You can change them any time that your wishes change. · A living will tells your family and your doctor your wishes about life support and other treatment. · A durable power of  for health care lets you name a person to make treatment decisions for you when you can't speak for yourself. This person is called a health care agent.  
If you do not have an advance directive, decisions about your medical care may be made by a doctor or a  who doesn't know you. It may help to think of an advance directive as a gift to the people who care for you. If you have one, they won't have to make tough decisions by themselves. Follow-up care is a key part of your treatment and safety. Be sure to make and go to all appointments, and call your doctor if you are having problems. It's also a good idea to know your test results and keep a list of the medicines you take. How can you care for yourself at home? · Discuss your wishes with your loved ones and your doctor. This way, there are no surprises. · Many states have a unique form. Or you might use a universal form that has been approved by many states. This kind of form can sometimes be completed and stored online. Your electronic copy will then be available wherever you have a connection to the Internet. In most cases, doctors will respect your wishes even if you have a form from a different state. · You don't need a  to do an advance directive. But you may want to get legal advice. · Think about these questions when you prepare an advance directive: ¨ Who do you want to make decisions about your medical care if you are not able to? Many people choose a family member or close friend. ¨ Do you know enough about life support methods that might be used? If not, talk to your doctor so you understand. ¨ What are you most afraid of that might happen? You might be afraid of having pain, losing your independence, or being kept alive by machines. ¨ Where would you prefer to die? Choices include your home, a hospital, or a nursing home. ¨ Would you like to have information about hospice care to support you and your family? ¨ Do you want to donate organs when you die? ¨ Do you want certain [facility] practices performed before you die? If so, put your wishes in the advance directive. · Read your advance directive every year, and make changes as needed. When should you call for help? Be sure to contact your doctor if you have any questions. Where can you learn more? Go to http://maura-jonna.info/. Enter R264 in the search box to learn more about \"Advance Directives: Care Instructions. \" Current as of: September 24, 2016 Content Version: 11.4 © 5533-8338 [employer]. Care instructions adapted under license by [employer] (which disclaims liability or warranty for this information). If you have questions about a medical condition or this instruction, always ask your healthcare professional. [address] any warranty or liability for your use of this information. Introducing [facility]! [employer] introduces [employer] patient portal. Now you can access parts of your medical record, email your doctor's office, and request medication refills online. 1. In your internet browser, go to [email or web address] 2. Click on the First Time User? Click Here link in the Sign In box. You will see the New Member Sign Up page. 3. Enter your SkillSlate Access Code exactly as it appears below. You will not need to use this code after youve completed the sign-up process. If you do not sign up before the expiration date, you must request a new code. · Sk[employer] Access Code: [email or web address] Expires: 10/22/2018  3:37 PM 
 
4. Enter the last four digits of your Social Security Number (xxxx) and Date of Birth (mm/dd/yyyy) as indicated and click Submit. You will be taken to the next sign-up page. 5. Create a APROOFEDt ID. This will be your SkillSlate login ID and cannot be changed, so think of one that is secure and easy to remember. 6. Create a SkillSlate password. You can change your password at any time. 7. Enter your Password Reset Question and Answer. This can be used at a later time if you forget your password. 8. Enter your e-mail address. You will receive e-mail notification when new information is available in [address] 9. Click Sign Up. You can now view and download portions of your medical record. 10. Click the Download Summary menu link to download a portable copy of your medical information. If you have questions, please visit the Frequently Asked Questions section of the [employer] website. Remember, [employer] is NOT to be used for urgent needs. For medical emergencies, dial 911. Now available from your iPhone and Android! Please provide this summary of care documentation to your next provider. Your primary care clinician is listed as Maureen Michelle. Lowell Mccallum. If you have any questions after today's visit, please call [phone].

## 2018-08-14 NOTE — PROGRESS NOTES
Dr. Waqar Ruggiero referred Honey Guerra (5/17/1926) a 80 y.o. female for a Medicare Annual Wellness Visit ([phone])    This is a Subsequent Medicare Annual Wellness Visit providing Personalized Prevention Plan Services (PPPS) (Performed 12 months after initial AWV and PPPS )    I have reviewed the patient's medical history in detail and updated the computerized patient record. History     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     Essential hypertension, benign     Stable     Hypercholesterolemia     Hypertension     Other and unspecified hyperlipidemia     Per PMD/ on Zocor    Type II or unspecified type diabetes mellitus without mention of complication, not stated as uncontrolled       Past Surgical History:   Procedure Laterality Date    HX HEENT      bilateral cataract surgery    HX HEMORRHOIDECTOMY      HX ORTHOPAEDIC      bunionectomy    HX OTHER SURGICAL      Esophageal dilatation     Current Outpatient Prescriptions   Medication Sig Dispense Refill    gabapentin (NEURONTIN) 100 mg capsule Take 1 Cap by mouth three (3) times daily. 270 Cap 2    simvastatin (ZOCOR) 80 mg tablet Take 1 Tab by mouth daily. 90 Tab 0    meclizine (ANTIVERT) 25 mg tablet Take 1 Tab by mouth three (3) times daily as needed. 60 Tab 1    metFORMIN ER (GLUCOPHAGE XR) 500 mg tablet Take 1 Tab by mouth daily (with breakfast). 90 Tab 2    clonazePAM (KLONOPIN) 0.5 mg tablet TAKE 1 TABLET BY MOUTH TWICE A DAY 60 Tab 1    magnesium hydroxide (KATE MILK OF MAGNESIA) 400 mg/5 mL suspension Take 30 mL by mouth daily as needed for Constipation.  ADULT ASPIRIN EC LOW STRENGTH PO Take 81 mg by mouth daily.        No Known Allergies  Family History   Problem Relation Age of Onset    Diabetes Father     Heart Disease Father     Heart Attack Sister      62s    Hypertension Other      Positive family history for essential hypertension    Elevated Lipids Other      Social History   Substance Use Topics    Smoking status: Never Smoker    Smokeless tobacco: Never Used    Alcohol use No     Patient Active Problem List   Diagnosis Code    Aortic valve disorders I35.9    Vasovagal syncope R55    Chronic constipation K59.09    Hyperlipidemia LDL goal <100 E78.5    Essential hypertension with goal blood pressure less than 140/90 I10    Type 2 diabetes mellitus without complication, without long-term current use of insulin (HCC) E11.9    Weakness generalized R53.1    Primary osteoarthritis involving multiple joints M15.0    Benign paroxysmal vertigo H81.10       Depression Risk Factor Screening:     PHQ over the last two weeks 8/14/2018   Little interest or pleasure in doing things Not at all   Feeling down, depressed, irritable, or hopeless Not at all   Total Score PHQ 2 0     Alcohol Risk Factor Screening: You do not drink alcohol or very rarely. Functional Ability and Level of Safety:     Hearing Loss   Hearing is good.     Activities of Daily Living   The home contains: no safety equipment  Patient does total self care    Requires assistance with: no ADLs (not able to do them as well as previously but can still do them)     ADL Assessment 8/14/2018   Feeding yourself No Help Needed   Getting from bed to chair No Help Needed   Getting dressed No Help Needed   Bathing or showering No Help Needed   Walk across the room (includes cane/walker) No Help Needed   Using the telphone No Help Needed   Taking your medications No Help Needed   Preparing meals No Help Needed   Managing money (expenses/bills) No Help Needed   Moderately strenuous housework (laundry) No Help Needed   Shopping for personal items (toiletries/medicines) No Help Needed   Shopping for groceries No Help Needed   Driving No Help Needed   Climbing a flight of stairs No Help Needed   Getting to places beyond walking distances No Help Needed       Fall Risk     Fall Risk Assessment, last 15 mths 8/14/2018   Able to walk? Yes   Fall in past 12 months? No   Fall with injury? -   Number of falls in past 12 months -   Fall Risk Score -     Abuse Screen   Patient is not abused    Abuse Screening Questionnaire 8/14/2018   Do you ever feel afraid of your partner? N   Are you in a relationship with someone who physically or mentally threatens you? N   Is it safe for you to go home? Y       Cognitive Screening     Evaluation of Cognitive Function:  Has your family/caregiver stated any concerns about your memory: no  Normal    Mood/affect:  neutral  Appearance: age appropriate and within normal Limits  Family member/caregiver input: N/A     No exam performed by pharmacist today, not required for Medicare Annual Wellness Visit. Patient to be seen by Dr. Jovita Bear separately. Patient Care Team:  Jovita Bear MD as PCP - General (Internal Medicine)  Renetta Alvarenga MD (Ophthalmology)  David Dowell, RN as Ambulatory Care Navigator (Internal Medicine)    Advice/Referrals/Counseling   Education and counseling provided:  End-of-Life planning (with patient's consent)  Pneumococcal Vaccine  Influenza Vaccine  Screening Mammography  Screening Pap and pelvic (covered once every 2 years)  Colorectal cancer screening tests  Cardiovascular screening blood test  Bone mass measurement (DEXA)  Screening for glaucoma  Diabetes screening test  Tdap / Zoster vaccination recommendations     Assessment/Plan       ICD-10-CM ICD-9-CM    1. Medicare annual wellness visit, subsequent Z00.00 V70.0 DEPRESSION SCREEN ANNUAL   2. Screening for depression Z13.89 V79.0 [facility] 68   . 3. Medication Reconciliation: Performed today and patient did not bring her medications to the visit. and the following changes were made.    Discontinued: below   Additions: none    Changes / other discrepancies: none     Medications Discontinued During This Encounter   Medication Reason    [facility] MAINE LANCETS) 30 gauge misc Therapy Completed    Lancets misc Therapy Completed    glucose blood VI test strips (BLOOD GLUCOSE TEST) strip Therapy Completed    Blood-Glucose Meter monitoring kit Therapy Completed       4. Immunizations: Patient confirmed the following records of vaccinations are correct and current.   -Pneumococcal: Vaccination series complete   -Influenza: Due when available      -Zoster:  Has received Zoster in the past, educated about Shingrix availability    -Tdap:  Not needed at this time. Educated about potential need in the future     Immunization History   Administered Date(s) Administered    Influenza High Dose Vaccine PF 10/05/2016    Influenza Vaccine 09/20/2014    Pneumococcal Conjugate (PCV-13) 11/21/2016    Pneumococcal Polysaccharide (PPSV-23) 11/17/2015       5. Screenings: (see patient instructions for chart/information):   -DEXA scan: Done    -Colonoscopy: Age out of further routine screenings    -Glaucoma screening/Eye exam: Done yearly, next due 3/2019   -Mammogram: last done 4/2017    -Lipid panel: 3/26/18   -Diabetes: 3/26/18     6. Advanced Care Planning: Patient educated on the importance of an advanced care plan and paperwork was given to the patient today. Asked patient to read over the information and bring it back to her next appointment with her physician. Patient verbalized understanding of information presented. Answered all of the patient's questions. AVS information was reviewed with patient and will be printed on checkout.     oRwena Valladares, PharmD, BCPS, BCACP, BC-ADM

## 2018-08-14 NOTE — PATIENT INSTRUCTIONS
Medicare Part B Preventive Services Limitations Recommendation Follow-up Action Needed    Bone Mass Measurement  (age 72 & older, biennial) Requires diagnosis related to osteoporosis or estrogen deficiency. Biennial benefit unless patient has history of long-term glucocorticoid tx or baseline is needed because initial test was by other method Last: 3/2/12    A DEXA scan is recommended after you turn 72years of age to determine your risk for osteoporosis Next: Follow up as recommended by primary care provider and/or specialist     Colorectal Cancer Screening  -Fecal occult blood test (annual)  -Flexible sigmoidoscopy (5y)  -Screening colonoscopy (10y)  -Barium Enema Normally recommended for patients age 48 - 78  Last: N/A     Every 5-10 years depending on your colonoscopy result starting at age 48 years Next: Follow up as recommended by primary care provider and/or specialist    Glaucoma Screening (no USPSTF recommendation) Diabetes mellitus, family history, , age 48 or over,  American, age 72 or over Last: 3/6/18    Every year, or as directed by provider Next: Follow up as recommended by primary care provider and/or specialist or at least yearly for dilated eye exam   Screening Mammography (biennial age 54-69) Annually (age 36 or over) Last: 4/11/17 Next: Follow up as recommended by primary care provider and/or specialist    Screening Pap Tests and Pelvic Examination (up to age 72 and after 72 if unknown history or abnormal study last 10 years) Every 19 months except high risk Last: n/a  Next: Discuss with your doctor if this screening is appropriate for you.    Cardiovascular Screening Blood Tests (every 5 years)  Total cholesterol, HDL, Triglycerides Order as a panel if possible Last: 3/26/18    Every Year Next: Follow up as recommended by primary care provider and/or specialist    Diabetes Screening Tests (at least every 3 years, Medicare covers annually or at 6-month intervals for prediabetic patients)    Fasting blood sugar (FBS) or glucose tolerance test (GTT) Patient must be diagnosed with one of the following:  -Hypertension, Dyslipidemia, obesity, previous impaired FBS or GTT  Or any two of the following: overweight, FH of diabetes, age ? 72, history of gestational diabetes, birth of baby weighing more than 9 pounds Last: 3/26/18    Every 3-6 months depending on your result    Every 3 years Next: Follow up as recommended by primary care provider and/or specialist    Diabetes Self-Management Training (DSMT) (no USPSTF recommendation) Requires referral by treating physician for patient with diabetes or renal disease. 10 hours of initial DSMT session of no less than 30 minutes each in a continuous 12-month period. 2 hours of follow-up DSMT in subsequent years. Last: N/A Talk to your doctor if you are interested in a refresher course. Medical Nutrition Therapy (MNT) (for diabetes or renal disease not recommended schedule) Requires referral by treating physician for patient with diabetes or renal disease. Can be provided in same year as diabetes self-management training (DSMT), and CMS recommends medical nutrition therapy take place after DSMT. Up to 3 hours for initial year and 2 hours in subsequent years. Last: N/A Talk to your doctor if you are interested in a refresher course.    Seasonal Influenza Vaccination (annually)  Last:      Every Fall Please consider getting one every fall    Pneumococcal Vaccination (once after 65)  Last:  Pneumovax: 11/17/15    Prevnar-13: 11/21/16   Next: Vaccination series complete   Shingles Vaccination  Last: 12/2015    A shingles vaccine is recommended once in a lifetime after age 61 Shingrix available from your local pharmacy, CDC recommends getting this even if you have gotten the previous shingles vaccine    Tetanus, Diphtheria and Pertussis (Tdap) Vaccination Booster One Booster as an adult and then tetanus every 10 years or as indicated Last: n/a Please consider, especially if you will have frequent contact with young children who have not completed their vaccine series. Can get at your pharmacy. Hepatitis B Vaccinations (if medium/high risk) Medium/high risk factors:  End-stage renal disease,  Hemophiliacs who received Factor VIII or IX concentrates, Clients of institutions for the mentally retarded, Persons who live in the same house as a HepB virus carrier, Homosexual men, Illicit injectable drug abusers. Last: N/A Next: N/A   Counseling to Prevent Tobacco Use (up to 8 sessions per year)  - Counseling greater than 3 and up to 10 minutes  - Counseling greater than 10 minutes Patients must be asymptomatic of tobacco-related conditions to receive as preventive service  As recommended by your PCP or specialist    Human Immunodeficiency Virus (HIV) Screening (annually for increased risk patients)  HIV-1 and HIV-2 by EIA, LESTER, rapid antibody test, or oral mucosa transudate Patient must be at increased risk for HIV infection per USPSTF guidelines or pregnant. Tests covered annually for patients at increased risk. Pregnant patients may receive up to 3 test during pregnancy. As recommended by your PCP or Specialist   Ultrasound Screening for Abdominal Aortic Aneurysm (AAA) once Patient must be referred through IPPE and not have had a screening for abdominal aortic aneurysm before under medicare. Limited to patients who meet onf of the following criteria:  -Men who are 73-68 years old and have smoked more than 100 cigarettes in their lifetime  -Anyone with a FH of AAA  -Anyone recommended for screening by the USPSFTF    As recommended by your PCP or Specialist     NA = Not Applicable; NI= Not Indicated     Advance Directives: Care Instructions  Your Care Instructions  An advance directive is a legal way to state your wishes at the end of your life. It tells your family and your doctor what to do if you can no longer say what you want.   There are two main types of advance directives. You can change them any time that your wishes change. · A living will tells your family and your doctor your wishes about life support and other treatment. · A durable power of  for health care lets you name a person to make treatment decisions for you when you can't speak for yourself. This person is called a health care agent. If you do not have an advance directive, decisions about your medical care may be made by a doctor or a  who doesn't know you. It may help to think of an advance directive as a gift to the people who care for you. If you have one, they won't have to make tough decisions by themselves. Follow-up care is a key part of your treatment and safety. Be sure to make and go to all appointments, and call your doctor if you are having problems. It's also a good idea to know your test results and keep a list of the medicines you take. How can you care for yourself at home? · Discuss your wishes with your loved ones and your doctor. This way, there are no surprises. · Many states have a unique form. Or you might use a universal form that has been approved by many states. This kind of form can sometimes be completed and stored online. Your electronic copy will then be available wherever you have a connection to the Internet. In most cases, doctors will respect your wishes even if you have a form from a different state. · You don't need a  to do an advance directive. But you may want to get legal advice. · Think about these questions when you prepare an advance directive:  ¨ Who do you want to make decisions about your medical care if you are not able to? Many people choose a family member or close friend. ¨ Do you know enough about life support methods that might be used? If not, talk to your doctor so you understand. ¨ What are you most afraid of that might happen?  You might be afraid of having pain, losing your independence, or being kept alive by machines. ¨ Where would you prefer to die? Choices include your home, a hospital, or a nursing home. ¨ Would you like to have information about hospice care to support you and your family? ¨ Do you want to donate organs when you die? ¨ Do you want certain [employer] practices performed before you die? If so, put your wishes in the advance directive. · Read your advance directive every year, and make changes as needed. When should you call for help? Be sure to contact your doctor if you have any questions. Where can you learn more? Go to http://maura-jonna.info/. Enter R264 in the search box to learn more about \"Advance Directives: Care Instructions. \"  Current as of: September 24, 2016  Content Version: 11.4  © 1048-2248 [employer]. Care instructions adapted under license by [employer] (which disclaims liability or warranty for this information). If you have questions about a medical condition or this instruction, always ask your healthcare professional. [address] any warranty or liability for your use of this information.

## 2018-08-14 NOTE — PROGRESS NOTES
Andres Quijano 5/17/1926, is a 80 y.o. female, who is seen today for     Reevaluation of dizziness that has been going on intermittently for many years, headaches that have been going on for many years, diabetes, anxiety, memory loss and hyperlipidemia. She is fasting today and we will do her lab work now. She has me again today about monitoring her glucose at home, I have previously told her that is not necessary since she just takes metformin and we do not have to worry about glucose running too low. Her anxiety if anything is a little better certainly no worse since she has been off clonazepam the last 2 or 3 months. Her headache and dizziness doing better the last couple of weeks, they were worse when I saw her 3 weeks ago and prescribed meclizine. She does have an appointment coming up to see the neurologist, Dr. Jazmin Mathur, in about 2 weeks. 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     Essential hypertension, benign     Stable     Hypercholesterolemia     Hypertension     Other and unspecified hyperlipidemia     Per PMD/ on Zocor    Type II or unspecified type diabetes mellitus without mention of complication, not stated as uncontrolled      Past Surgical History:   Procedure Laterality Date    HX HEENT      bilateral cataract surgery    HX HEMORRHOIDECTOMY      HX ORTHOPAEDIC      bunionectomy    HX OTHER SURGICAL      Esophageal dilatation     Current Outpatient Prescriptions   Medication Sig Dispense Refill    gabapentin (NEURONTIN) 100 mg capsule Take 1 Cap by mouth three (3) times daily. 270 Cap 2    simvastatin (ZOCOR) 80 mg tablet Take 1 Tab by mouth daily. 90 Tab 0    meclizine (ANTIVERT) 25 mg tablet Take 1 Tab by mouth three (3) times daily as needed. 60 Tab 1    metFORMIN ER (GLUCOPHAGE XR) 500 mg tablet Take 1 Tab by mouth daily (with breakfast).  90 Tab 2    clonazePAM (KLONOPIN) 0.5 mg tablet TAKE 1 TABLET BY MOUTH TWICE A DAY 60 Tab 1    magnesium hydroxide (KATE MILK OF MAGNESIA) 400 mg/5 mL suspension Take 30 mL by mouth daily as needed for Constipation.  ADULT ASPIRIN EC LOW STRENGTH PO Take 81 mg by mouth daily. No Known Allergies  Social History     Social History    Marital status:      Spouse name: N/A    Number of children: N/A    Years of education: N/A     Social History Main Topics    Smoking status: Never Smoker    Smokeless tobacco: Never Used    Alcohol use No    Drug use: No    Sexual activity: Not Currently     Other Topics Concern    None     Social History Narrative     Visit Vitals    /72    Pulse 83    Temp 98.3 °F (36.8 °C) (Oral)    Resp 12    Ht 4' 11\" (1.499 m)    Wt 139 lb 6.4 oz (63.2 kg)    SpO2 98%    BMI 28.16 kg/m2     Right ear canal and tympanic membrane appear normal with good light reflex and there is about 80% blockage on the left side and I cannot see the tympanic membrane. Oral cavity reveals no lesions. Neck reveals no adenopathy or thyromegaly. Carotids are 2+ without bruits. Lungs are clear to percussion. Good breath sounds with no wheezing or crackles. Heart reveals a regular rhythm with normal S1 and S2 no murmur gallop click or rub. Apical impulse is not palpable. Abdomen is soft and nontender with no hepatosplenomegaly or masses and no bruits. Extremities reveal no clubbing cyanosis or edema. Pulses are 2+ except diminished dorsalis pedis pulses. The feet reveal minimal bunion deformities with no calluses or ulceration. Normal sensation to monofilament testing. Breasts reveal no masses no skin or nipple abnormalities and no axillary adenopathy. Assessment:   #1.  Diabetes has been well controlled, she will continue metformin  mg with breakfast and diabetic diet but I told her again today she does not need to be monitoring glucose at home, hemoglobin A1c here every 4 months should be adequate.    #2.  Chronic intermittent vertigo, she will use meclizine when she has rather severe vertigo. #3.  Chronic intermittent headaches without migraine symptoms. She will be seeing Dr. Ruth Carl on August 27. #4.  Chronic anxiety no worse since being off clonazepam.  We will avoid use of benzodiazepines going forward, she had been on alprazolam and more recently clonazepam which for her probably potentially cause more side effects than benefit. #5. Hyperlipidemia has been doing well, she will continue low-fat diet and simvastatin 80 mg each evening and we will check fasting lipids now. Follow-up in 4 months or sooner if needed. She had a Medicare wellness evaluation this morning and I agree with Negrito's note. Patrick Gamble MD [facility]    Please note: This document has been produced using voice recognition software. Unrecognized errors in transcription may be present.

## 2018-08-15 DIAGNOSIS — E55.9 HYPOVITAMINOSIS D: Primary | ICD-10-CM

## 2018-08-15 LAB
25(OH)D3 SERPL-MCNC: 10.9 NG/ML (ref 30–100)
CREAT UR-MCNC: 101.09 MG/DL (ref 30–125)
MICROALBUMIN UR-MCNC: 1.6 MG/DL (ref 0–3)
MICROALBUMIN/CREAT UR-RTO: 16 MG/G (ref 0–30)

## 2018-08-15 RX ORDER — CHOLECALCIFEROL (VITAMIN D3) 125 MCG
CAPSULE ORAL
Qty: 100 TAB
Start: 2018-08-15 | End: 2019-02-11

## 2018-08-15 NOTE — PROGRESS NOTES
Please notify the patient that all of her lab work looks good except vitamin D is very low. I recommend using 2000 units of vitamin D over-the-counter every day for the rest of her life to help keep her bones healthy. Her hemoglobin A1c that measures average glucose is excellent at 6. 8.

## 2018-08-16 ENCOUNTER — TELEPHONE (OUTPATIENT)
Dept: INTERNAL MEDICINE CLINIC | Age: 83
End: 2018-08-16

## 2018-08-27 ENCOUNTER — OFFICE VISIT (OUTPATIENT)
Dept: NEUROLOGY | Age: 83
End: 2018-08-27

## 2018-08-27 VITALS
DIASTOLIC BLOOD PRESSURE: 68 MMHG | WEIGHT: 141 LBS | OXYGEN SATURATION: 96 % | TEMPERATURE: 98.3 F | HEIGHT: 59 IN | BODY MASS INDEX: 28.43 KG/M2 | SYSTOLIC BLOOD PRESSURE: 140 MMHG | HEART RATE: 74 BPM | RESPIRATION RATE: 16 BRPM

## 2018-08-27 DIAGNOSIS — R42 LIGHTHEADED: ICD-10-CM

## 2018-08-27 DIAGNOSIS — R42 DIZZY: Primary | ICD-10-CM

## 2018-08-27 DIAGNOSIS — R40.20 LOC (LOSS OF CONSCIOUSNESS) (HCC): ICD-10-CM

## 2018-08-27 NOTE — PROGRESS NOTES
[facility]  [address], [address], [place], [address]  [address] [place], [address]  Office:  [phone]  Fax: [phone]    Referring: Milla Baldwin MD      Chief Complaint   Patient presents with   [facility]    Dizziness     Referred by Dr. Jorge Dee for dizziness, states she has had vertigo for since the first Sunday in July, she states Meclazine helped in the beginning but did not feel it was helping enough to get e refill. c/o \"discomfort\" at the base of her head/upper neck      70-year-old woman who comes today for evaluation of what she calls dizziness. She tells me she had the abrupt onset of dizziness the first Sunday in July. She notes that this is the second time that she has had vertigo. When I asked her why she thinks she has vertigo she tells me that is what it has been called. She however frankly denies any spinning or movement of the room. She describes her dizziness as a feeling of lightheadedness as though she might pass out. She relates that about a year ago she was in [place] with her son and she felt the same type of sensation. She was trying to get to where her son was seated and she lost consciousness. She was taken to a local emergency department where she was examined evaluated and released. She has not had further loss of consciousness since that event. This dizziness however is akin in  sensation to what she felt when she had loss of consciousness. She indicates that it can be positional in that if she goes from a seated to a standing position and \"do not get myself together\" then she can have the sensation come on. However, she states that just while sitting in the chair today she feels dizzy and she says she has that same sensation of lightheadedness or though she may pass out even while sitting. Nothing seems to make it better or worse.   She was given some meclizine and she says that she did not think it made that much difference so she did not get the prescription refilled. She has not had any perioral numbness. No difficulty with speech language or swallowing. No facial numbness otherwise. No numbness or tingling in the limbs. No focal weakness. No awakening in the floor not knowing how she got there. Not had palpitations. No chest pain. No shortness of breath. No fevers or chills. There was no inciting factor for this to start. No recent fall. No recent injury of other type. No recent changes in medication that she can recall. Nothing that would have a temporal relationship to her symptoms. In addition she notes she is having some neck pain particularly when she awakens and thinks that she may be sleeping wrong. No radicular symptom. Again no weakness in the extremities. No numbness or tingling in the arms or hands. 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     Essential hypertension, benign     Stable     Hypercholesterolemia     Hypertension     Other and unspecified hyperlipidemia     Per PMD/ on Zocor    Type II or unspecified type diabetes mellitus without mention of complication, not stated as uncontrolled        Past Surgical History:   Procedure Laterality Date    HX HEENT      bilateral cataract surgery    HX HEMORRHOIDECTOMY      HX ORTHOPAEDIC      bunionectomy    HX OTHER SURGICAL      Esophageal dilatation       Current Outpatient Prescriptions   Medication Sig Dispense Refill    cholecalciferol, vitamin D3, (VITAMIN D3) 2,000 unit tab 1 tablet by mouth every day 100 Tab prn    gabapentin (NEURONTIN) 100 mg capsule Take 1 Cap by mouth three (3) times daily. 270 Cap 2    simvastatin (ZOCOR) 80 mg tablet Take 1 Tab by mouth daily. 90 Tab 0    metFORMIN ER (GLUCOPHAGE XR) 500 mg tablet Take 1 Tab by mouth daily (with breakfast).  90 Tab 2    magnesium hydroxide (KATE MILK OF MAGNESIA) 400 mg/5 mL suspension Take 30 mL by mouth daily as needed for Constipation.  ADULT ASPIRIN EC LOW STRENGTH PO Take 81 mg by mouth daily. No Known Allergies    Social History   Substance Use Topics    Smoking status: Never Smoker    Smokeless tobacco: Never Used    Alcohol use No       Family History   Problem Relation Age of Onset    Diabetes Father     Heart Disease Father     Heart Attack Sister      62s    Hypertension Other      Positive family history for essential hypertension    Elevated Lipids Other        Review of Systems:  Pertinent positives and negatives as noted otherwise comprehensive review is negative. Physical Examination:    Visit Vitals    /68 (BP 1 Location: Right arm, BP Patient Position: Sitting)    Pulse 74    Temp 98.3 °F (36.8 °C) (Oral)    Resp 16    Ht 4' 11\" (1.499 m)    Wt 64 kg (141 lb)    SpO2 96%    BMI 28.48 kg/m2     She is a pleasant elderly lady who looks much younger than her stated age of 80 years. Her dress and grooming are appropriate. Affect appropriate. She is awake and interactive. She has no scleral icterus. Her neck is supple. I do not hear bruit. Her heart is regular. Her pulses are symmetrical.  She has no edema of her lower extremities. She has no point tenderness about the cranium or the cervical spine. Neurologically she is awake alert and oriented. She discusses her medical history medications. She discusses current events. She is fluent. She has intact cranial nerves II-XII. No nystagmus. No pronation and no drift. She has no abnormal movement present. She has age-related paratonia throughout. She generates full strength in the upper and lower extremities in all muscle groups to direct testing. Her reflexes are symmetrical in the upper and lower extremities bilaterally. No pathologic reflexes are elicited. She has no ataxia. No difficulty with rapid alternating movements. Sensory intact primary.   Gait is steady. Impression/Plan  Elderly lady with dizziness and the dizziness is described as a lightheadedness without vertigo which had abrupt onset and has been persistent and is somewhat positional although again she complains even in primary position of feeling lightheaded. She did have a loss of consciousness associated with this same type of symptom I will be at some time ago but again the symptom was the same. Secondary to this we will go ahead and send her for an MRI of the brain to specifically evaluate the posterior fossa. We will get an EEG as well and also check carotid Doppler. She will follow-up at the conclusion of her testing. Continue with her aspirin. Continue with her statin. Signed By: Beverly Andrade MD          This note was created using voice recognition software. Despite editing, there may be syntax errors. This note will not be viewable in [address]

## 2018-08-27 NOTE — PROGRESS NOTES
Chief Complaint   Patient presents with    Establish Care    Dizziness     Referred by Dr. Donavon Garcia for dizziness, states she has had vertigo for since the first Sunday in July, she states Meclazine helped in the beginning but did not feel it was helping enough to get e refill.  c/o \"discomfort\" at the base of her head/upper neck

## 2018-08-27 NOTE — MR AVS SNAPSHOT
[address] 200 [facility] 
[phone] Patient: Arnetha Hodgkin MRN: [id number] AGW:[id number] Visit Information Date & Time Provider Department Dept. Phone Encounter #  
 8/27/2018  3:15 PM Mane Shane MD [facility] at [facility] [id number] Follow-up Instructions Return for After tests. Upcoming Health Maintenance Date Due DTaP/Tdap/Td series (1 - Tdap) 5/17/1947 Influenza Age 5 to Adult 8/1/2018 HEMOGLOBIN A1C Q6M 2/14/2019 EYE EXAM RETINAL OR DILATED Q1 3/6/2019 FOOT EXAM Q1 8/14/2019 MICROALBUMIN Q1 8/14/2019 LIPID PANEL Q1 8/14/2019 MEDICARE YEARLY EXAM 8/15/2019 GLAUCOMA SCREENING Q2Y 3/6/2020 Allergies as of 8/27/2018  Review Complete On: 8/27/2018 By: Mane Shane MD  
 No Known Allergies Current Immunizations  Reviewed on 11/21/2016 Name Date Influenza High Dose Vaccine PF 10/5/2016  9:54 AM  
 Influenza Vaccine 9/20/2014 Pneumococcal Conjugate (PCV-13) 11/21/2016 11:46 AM  
 Pneumococcal Polysaccharide (PPSV-23) 11/17/2015 Not reviewed this visit You Were Diagnosed With   
  
 Codes Comments Dizzy    -  Primary ICD-10-CM: W51 ICD-9-CM: 780.4 Lightheaded     ICD-10-CM: P69 ICD-9-CM: 780.4 LOC (loss of consciousness) ([facility] [address]     ICD-10-CM: R40.20 ICD-9-CM: 780.09 Vitals BP Pulse Temp Resp Height(growth percentile) Weight(growth percentile) 140/68 (BP 1 Location: Right arm, BP Patient Position: Sitting) 74 98.3 °F (36.8 °C) (Oral) 16 4' 11\" (1.499 m) 141 lb (64 kg) SpO2 BMI OB Status Smoking Status 96% 28.48 kg/m2 Postmenopausal Never Smoker Vitals History BMI and BSA Data Body Mass Index Body Surface Area  
 28.48 kg/m 2 1.63 m 2 Preferred Pharmacy Pharmacy Name Phone [facility] [place], [address] [phone] Your Updated Medication List  
  
   
This list is accurate as of 8/27/18  3:58 PM.  Always use your most recent med list.  
  
  
  
  
 ADULT ASPIRIN EC LOW STRENGTH PO Take 81 mg by mouth daily. cholecalciferol (vitamin D3) 2,000 unit Tab Commonly known as:  VITAMIN D3  
1 tablet by mouth every day  
  
 gabapentin 100 mg capsule Commonly known as:  NEURONTIN Take 1 Cap by mouth three (3) times daily. metFORMIN  mg tablet Commonly known as:  GLUCOPHAGE XR Take 1 Tab by mouth daily (with breakfast). KATE MILK OF MAGNESIA 400 mg/5 mL suspension Generic drug:  magnesium hydroxide Take 30 mL by mouth daily as needed for Constipation. simvastatin 80 mg tablet Commonly known as:  ZOCOR Take 1 Tab by mouth daily. Follow-up Instructions Return for After tests. To-Do List   
 08/27/2018 Vascular/US:  DUPLEX CAROTID BILATERAL   
  
 08/27/2018 Neurology:  EEG   
  
 08/27/2018 Imaging:  MRI BRAIN WO CONT Introducing [facility]! [employer] introduces [employer] patient portal. Now you can access parts of your medical record, email your doctor's office, and request medication refills online. 1. In your internet browser, go to [email or web address] 2. Click on the First Time User? Click Here link in the Sign In box. You will see the New Member Sign Up page. 3. Enter your [employer] Access Code exactly as it appears below. You will not need to use this code after youve completed the sign-up process. If you do not sign up before the expiration date, you must request a new code. · [employer] Access Code: [email or web address] Expires: 10/22/2018  3:37 PM 
 
4. Enter the last four digits of your Social Security Number (xxxx) and Date of Birth (mm/dd/yyyy) as indicated and click Submit. You will be taken to the next sign-up page. 5. Create a [employer] ID.  This will be your [employer] login ID and cannot be changed, so think of one that is secure and easy to remember. 6. Create a [employer] password. You can change your password at any time. 7. Enter your Password Reset Question and Answer. This can be used at a later time if you forget your password. 8. Enter your e-mail address. You will receive e-mail notification when new information is available in [address] 9. Click Sign Up. You can now view and download portions of your medical record. 10. Click the Download Summary menu link to download a portable copy of your medical information. If you have questions, please visit the Frequently Asked Questions section of the [employer] website. Remember, [employer] is NOT to be used for urgent needs. For medical emergencies, dial 911. Now available from your iPhone and Android! Please provide this summary of care documentation to your next provider. Your primary care clinician is listed as Cristofer Gupta. Mitra Short. If you have any questions after today's visit, please call [phone].

## 2018-08-31 NOTE — TELEPHONE ENCOUNTER
Last Visit: 08/14/2018 with MD Carol Archibald    Next Appointment: noted to f/u in 4 months   Previous Refill Encounters: 07/10/2018 per MD Carol Archibald #[id number] with 1 refill     Requested Prescriptions     Pending Prescriptions Disp Refills    meclizine (ANTIVERT) 25 mg tablet 60 Tab 1     Sig: Take 1 Tab by mouth three (3) times daily as needed.

## 2018-09-06 RX ORDER — MECLIZINE HYDROCHLORIDE 25 MG/1
25 TABLET ORAL
Qty: 60 TAB | Refills: 0 | Status: SHIPPED | OUTPATIENT
Start: 2018-09-06 | End: 2018-10-09 | Stop reason: SDUPTHER

## 2018-09-10 ENCOUNTER — HOSPITAL ENCOUNTER (OUTPATIENT)
Dept: NEUROLOGY | Age: 83
Discharge: HOME | End: 2018-09-10
Payer: MEDICARE

## 2018-09-10 ENCOUNTER — HOSPITAL ENCOUNTER (OUTPATIENT)
Dept: VASCULAR SURGERY | Age: 83
Discharge: HOME | End: 2018-09-10
Payer: MEDICARE

## 2018-09-10 ENCOUNTER — HOSPITAL ENCOUNTER (OUTPATIENT)
Dept: MRI IMAGING | Age: 83
Discharge: HOME | End: 2018-09-10
Payer: MEDICARE

## 2018-09-10 DIAGNOSIS — R40.20 LOC (LOSS OF CONSCIOUSNESS) (HCC): ICD-10-CM

## 2018-09-10 DIAGNOSIS — R42 DIZZY: ICD-10-CM

## 2018-09-10 DIAGNOSIS — R42 LIGHTHEADED: ICD-10-CM

## 2018-09-10 LAB
LEFT CCA DIST DIAS: 11.14 CM/S
LEFT CCA DIST SYS: 66.06 CM/S
LEFT CCA MID DIAS: 15.52 CM/S
LEFT CCA MID SYS: 67.26 CM/S
LEFT CCA PROX DIAS: 15.52 CM/S
LEFT CCA PROX SYS: 71.97 CM/S
LEFT ECA DIAS: 0 CM/S
LEFT ECA SYS: 42.76 CM/S
LEFT ICA DIST DIAS: 18.51 CM/S
LEFT ICA DIST SYS: 87.2 CM/S
LEFT ICA MID DIAS: 13.59 CM/S
LEFT ICA MID SYS: 51.21 CM/S
LEFT ICA PROX DIAS: 17.53 CM/S
LEFT ICA PROX SYS: 54.86 CM/S
LEFT ICA/CCA SYS: 1.3
LEFT SUBCLAVIAN SYS: 88.7 CM/S
LEFT VERTEBRAL DIAS: 10.38 CM/S
LEFT VERTEBRAL SYS: 42.66 CM/S
RIGHT CCA DIST DIAS: 8.74 CM/S
RIGHT CCA DIST SYS: 73.32 CM/S
RIGHT CCA MID DIAS: 12.54 CM/S
RIGHT CCA MID SYS: 61.92 CM/S
RIGHT CCA PROX DIAS: 12.91 CM/S
RIGHT CCA PROX SYS: 71.61 CM/S
RIGHT ECA DIAS: 0 CM/S
RIGHT ECA SYS: 84.69 CM/S
RIGHT ICA DIST DIAS: 25.12 CM/S
RIGHT ICA DIST SYS: 86.86 CM/S
RIGHT ICA MID DIAS: 15.46 CM/S
RIGHT ICA MID SYS: 53.89 CM/S
RIGHT ICA PROX DIAS: 12.03 CM/S
RIGHT ICA PROX SYS: 40.3 CM/S
RIGHT ICA/CCA SYS: 1.2
RIGHT SUBCLAVIAN SYS: 103.4 CM/S
RIGHT VERTEBRAL DIAS: 8.38 CM/S
RIGHT VERTEBRAL SYS: 40.57 CM/S

## 2018-09-10 PROCEDURE — 93880 EXTRACRANIAL BILAT STUDY: CPT

## 2018-09-10 PROCEDURE — 95816 EEG AWAKE AND DROWSY: CPT

## 2018-09-10 PROCEDURE — 70551 MRI BRAIN STEM W/O DYE: CPT

## 2018-09-10 NOTE — PROCEDURES
[id number]  MR#: [id number]  : 1926  ACCOUNT #: [de-identified]   DATE OF SERVICE: 09/10/2018    EEG NUMBER . REFERRING PHYSICIAN:  Asad Moore MD    HISTORY:  A 60-year-old female with history of syncope. MEDICATIONS:  Meclizine, gabapentin, simvastatin, Glucophage, magnesium hydroxide. ELECTROENCEPHALOGRAM REPORT:  This is a 16-channel routine EEG done using the International 10-20 electrode placement system. The predominant background consists of temporal predominant 8 Hz rhythmic background which has a slightly lower amplitude on the left temporal area. There is attenuation with eye opening. Hyperventilation did not produce any abnormalities. There were no abnormal photoparoxysmal responses. There was no evidence of focal slowing. No epileptiform abnormalities were observed. IMPRESSION:  This is a normal wakeful EEG.       Pia Colbert MD       JEAYESHA /   D: 09/10/2018 12:39     T: 09/10/2018 14:18  JOB #: [id number]

## 2018-09-24 ENCOUNTER — OFFICE VISIT (OUTPATIENT)
Dept: NEUROLOGY | Age: 83
End: 2018-09-24

## 2018-09-24 VITALS
SYSTOLIC BLOOD PRESSURE: 128 MMHG | WEIGHT: 146 LBS | HEIGHT: 59 IN | BODY MASS INDEX: 29.43 KG/M2 | OXYGEN SATURATION: 97 % | DIASTOLIC BLOOD PRESSURE: 70 MMHG | RESPIRATION RATE: 18 BRPM | TEMPERATURE: 97.7 F | HEART RATE: 82 BPM

## 2018-09-24 DIAGNOSIS — E11.8 TYPE 2 DIABETES MELLITUS WITH COMPLICATION, WITHOUT LONG-TERM CURRENT USE OF INSULIN (HCC): ICD-10-CM

## 2018-09-24 DIAGNOSIS — Z79.899 ON STATIN THERAPY: ICD-10-CM

## 2018-09-24 DIAGNOSIS — I10 ESSENTIAL HYPERTENSION: ICD-10-CM

## 2018-09-24 DIAGNOSIS — E78.5 DYSLIPIDEMIA, GOAL LDL BELOW 70: ICD-10-CM

## 2018-09-24 DIAGNOSIS — Z79.02 ANTIPLATELET OR ANTITHROMBOTIC LONG-TERM USE: ICD-10-CM

## 2018-09-24 DIAGNOSIS — I63.30 CEREBROVASCULAR ACCIDENT (CVA) DUE TO THROMBOSIS OF CEREBRAL ARTERY (HCC): Primary | ICD-10-CM

## 2018-09-24 RX ORDER — CLOPIDOGREL BISULFATE 75 MG/1
TABLET ORAL
Qty: 30 TAB | Refills: 11 | Status: SHIPPED | OUTPATIENT
Start: 2018-09-24 | End: 2019-01-11 | Stop reason: ALTCHOICE

## 2018-09-24 NOTE — PROGRESS NOTES
[address] [place], [address] [facility], [place], [address] Office:  [phone]  Fax: [phone] Current Outpatient Prescriptions Medication Sig Dispense Refill  meclizine (ANTIVERT) 25 mg tablet Take 1 Tab by mouth three (3) times daily as needed. 60 Tab 0  cholecalciferol, vitamin D3, (VITAMIN D3) 2,000 unit tab 1 tablet by mouth every day 100 Tab prn  
 gabapentin (NEURONTIN) 100 mg capsule Take 1 Cap by mouth three (3) times daily. 270 Cap 2  
 simvastatin (ZOCOR) 80 mg tablet Take 1 Tab by mouth daily. 90 Tab 0  
 metFORMIN ER (GLUCOPHAGE XR) 500 mg tablet Take 1 Tab by mouth daily (with breakfast). 90 Tab 2  
 magnesium hydroxide (KATE MILK OF MAGNESIA) 400 mg/5 mL suspension Take 30 mL by mouth daily as needed for Constipation.  ADULT ASPIRIN EC LOW STRENGTH PO Take 81 mg by mouth daily. No Known Allergies Social History Substance Use Topics  Smoking status: Never Smoker  Smokeless tobacco: Never Used  Alcohol use No  
 
Patient returns today for follow-up from her recent initial evaluation for complaints of dizziness. She had an MRI which showed some small vessel changes and diffusion-weighted images changes consistent with a subacute infarct in the nayeli. I reviewed that film today personally. EEG was performed and normal.  Carotid Doppler with no significant stenosis. She is on aspirin and a statin. She was taking 81 mg of aspirin daily when this occurred. She notes that her dizziness or vertigo is gotten better. She takes as needed meclizine here there. No loss or alteration in consciousness. No fall. No chest pain palpitations shortness of breath. She has not been ill. No fever chills. She has not had any further symptoms suggestive of cerebrovascular ischemia Examination Visit Vitals  /70 (BP 1 Location: Left arm, BP Patient Position: Sitting)  Pulse 82  Temp 97.7 °F (36.5 °C) (Oral)  Resp 18  Ht 4' 11\" (1.499 m)  Wt 66.2 kg (146 lb)  SpO2 97%  BMI 29.49 kg/m2 Very pleasant elderly lady who is much younger appearing than her stated age of 80. She is with no icterus. Symmetric pulses. Heart regular. No edema the extremities. Her dress grooming and affect are appropriate. She has full versions without nystagmus. Visual fields are full. No pronation and no drift. She resists fully in the upper and lower extremities in all muscle groups to direct testing. No ataxia. She ambulates steadily. 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 We discussed stroke risk factors including keeping her blood pressure in line with current guidelines and she is just a touch over today with that systolic of 762/88. Temper the new guidelines with the fact that she is elderly and does have some atherosclerosis. Would like to keep her around that 172 range systolic and certainly less than 80 diastolic and she is right about there. In terms of her cholesterol her LDL should be pushed below 70. She should continue aspirin 81 mg. We discussed adding Plavix 75 mg daily and discussed the mechanism of action and the rationale behind dual therapy. Discussed the risk of bleeding and easy bruising. I asked her to watch her stool for any dark tarry stools. Discussed stroke warning signs and to call 911 should they occur. We will start Plavix today. Modify those modifiable risk factors as noted above as well as keeping her sugar tightly controlled and from her last hemoglobin A1c last month she certainly is doing that with that being 6.8. We will have her check in with 1 of our nurse practitioners in about 3 months to make sure things are going well. At that point if she remains asymptomatic we will relegate her to as needed follow-up Shirin Irby MD 
 
 This note will not be viewable in [address]

## 2018-09-24 NOTE — MR AVS SNAPSHOT
[address] [facility] 
 
 
 [address] [facility] 
[phone] Patient: Rambo Govea MRN: [id number] Visit Information Date & Time Provider Department Dept. Phone Encounter #  
 9/24/2018  9:30 AM Ander Blackwood MD [address] at [address] [place] [address] [id number] Follow-up Instructions Return in about 3 months (around 12/24/2018) for With Gloria Soriano or Roxi Chandra. Follow-up and Disposition History Your Appointments 12/19/2018  8:30 AM  
Follow Up with Job [place], NP [address] at [address] [facility] 365[address] [facility]) Appt Note: 3 mo f/u  
 [address] [employer] [address] [address] [address] 200 [facility] Upcoming Health Maintenance Date Due DTaP/Tdap/Td series (1 - Tdap) 5/17/1947 HEMOGLOBIN A1C Q6M 2/14/2019 EYE EXAM RETINAL OR DILATED Q1 3/6/2019 FOOT EXAM Q1 8/14/2019 MICROALBUMIN Q1 8/14/2019 LIPID PANEL Q1 8/14/2019 MEDICARE YEARLY EXAM 8/15/2019 GLAUCOMA SCREENING Q2Y 3/6/2020 Allergies as of 9/24/2018  Review Complete On: 9/24/2018 By: Ander Blackwood MD  
 No Known Allergies Current Immunizations  Reviewed on 11/21/2016 Name Date Influenza High Dose Vaccine PF 9/7/2018, 10/5/2016  9:54 AM  
 Influenza Vaccine 9/20/2014 Pneumococcal Conjugate (PCV-13) 11/21/2016 11:46 AM  
 Pneumococcal Polysaccharide (PPSV-23) 11/17/2015 Not reviewed this visit You Were Diagnosed With   
  
 Codes Comments Cerebrovascular accident (CVA) due to thrombosis of cerebral artery ([facility] [address]    -  Primary ICD-10-CM: I63.30 ICD-9-CM: 434.01 Type 2 diabetes mellitus with complication, without long-term current use of insulin (HCC)     ICD-10-CM: E11.8 ICD-9-CM: 250.90 Essential hypertension     ICD-10-CM: I10 
ICD-9-CM: 401.9 Dyslipidemia, goal LDL below 70     ICD-10-CM: E78.5 ICD-9-CM: 272.4 Antiplatelet or antithrombotic long-term use     ICD-10-CM: Z79.02 
ICD-9-CM: V58.63 On statin therapy     ICD-10-CM: Z79.899 ICD-9-CM: V58.69 Vitals BP Pulse Temp Resp Height(growth percentile) Weight(growth percentile) 128/70 (BP 1 Location: Left arm, BP Patient Position: Sitting) 82 97.7 °F (36.5 °C) (Oral) 18 4' 11\" (1.499 m) 146 lb (66.2 kg) SpO2 BMI OB Status Smoking Status 97% 29.49 kg/m2 Postmenopausal Never Smoker BMI and BSA Data Body Mass Index Body Surface Area  
 29.49 kg/m 2 1.66 m 2 Preferred Pharmacy Pharmacy Name Phone [id number]- [place] [address] [phone] Your Updated Medication List  
  
   
This list is accurate as of 18 10:33 AM.  Always use your most recent med list.  
  
  
  
  
 ADULT ASPIRIN EC LOW STRENGTH PO Take 81 mg by mouth daily. cholecalciferol (vitamin D3) 2,000 unit Tab Commonly known as:  VITAMIN D3  
1 tablet by mouth every day  
  
 clopidogrel 75 mg Tab Commonly known as:  PLAVIX 1 po qday  
  
 gabapentin 100 mg capsule Commonly known as:  NEURONTIN Take 1 Cap by mouth three (3) times daily. meclizine 25 mg tablet Commonly known as:  ANTIVERT Take 1 Tab by mouth three (3) times daily as needed. metFORMIN  mg tablet Commonly known as:  GLUCOPHAGE XR Take 1 Tab by mouth daily (with breakfast). KATE MILK OF MAGNESIA 400 mg/5 mL suspension Generic drug:  magnesium hydroxide Take 30 mL by mouth daily as needed for Constipation. simvastatin 80 mg tablet Commonly known as:  ZOCOR Take 1 Tab by mouth daily. Prescriptions Sent to Pharmacy Refills  
 clopidogrel (PLAVIX) 75 mg tab 11 Si po qday Class: Normal  
 Pharmacy: [address] [place] [address] #: [phone] Follow-up Instructions Return in about 3 months (around 12/24/2018) for With Marlin Anderson or Gabrielle Beach. Patient Instructions Thank you for choosing [facility] and [facility] [[email or web address]] Neurology Clinic for your  
 
care. You may receive a survey about your visit. We appreciate you taking time  
 
to complete this survey as we use your feedback to improve our services. We  
 
realize we are not perfect, but we strive to provide excellent care. Please provide this summary of care documentation to your next provider. Your primary care clinician is listed as Suleman Brown. Agustina Hernandez. If you have any questions after today's visit, please call [phone].

## 2018-09-24 NOTE — PATIENT INSTRUCTIONS
Thank you for choosing [address] [place] [address] and [address] [place] [address] Neurology Clinic for your  
 
care. You may receive a survey about your visit. We appreciate you taking time  
 
to complete this survey as we use your feedback to improve our services. We  
 
realize we are not perfect, but we strive to provide excellent care.

## 2018-10-09 RX ORDER — MECLIZINE HYDROCHLORIDE 25 MG/1
25 TABLET ORAL
Qty: 60 TAB | Refills: 2 | Status: SHIPPED | OUTPATIENT
Start: 2018-10-09 | End: 2018-10-24 | Stop reason: SDUPTHER

## 2018-10-25 RX ORDER — MECLIZINE HYDROCHLORIDE 25 MG/1
25 TABLET ORAL
Qty: 270 TAB | Refills: 0 | Status: SHIPPED | OUTPATIENT
Start: 2018-10-25 | End: 2019-02-15 | Stop reason: SDUPTHER

## 2018-10-29 ENCOUNTER — TELEPHONE (OUTPATIENT)
Dept: NEUROLOGY | Age: 83
End: 2018-10-29

## 2018-10-29 NOTE — TELEPHONE ENCOUNTER
Pt states that she is experiencing continued \"misery\" at the base of her neck and would like to know if Dr. Kianna Soto would suggest that she cant take Tylenol. When asked to describe in detail she stated it was a sort of continued pain. She experiences this more night when trying to sleep. Please advise.  Pt can be reached at [phone]

## 2018-10-30 ENCOUNTER — TELEPHONE (OUTPATIENT)
Dept: INTERNAL MEDICINE CLINIC | Age: 83
End: 2018-10-30

## 2018-10-30 NOTE — TELEPHONE ENCOUNTER
Pt calling asking RM to call her. Wants to know if RM got the results from the neurologist. Also wants to ask a couple other questions. She would not give me details. Says she will tell the doctor.

## 2018-10-30 NOTE — TELEPHONE ENCOUNTER
Spoke with patient verified name and ,informed of NP recommendations. Patient verbalizes understanding.

## 2018-11-12 RX ORDER — SIMVASTATIN 80 MG/1
80 TABLET, FILM COATED ORAL DAILY
Qty: 90 TAB | Refills: 3 | Status: SHIPPED | OUTPATIENT
Start: 2018-11-12 | End: 2019-11-15 | Stop reason: SDUPTHER

## 2018-11-12 NOTE — TELEPHONE ENCOUNTER
Last Visit: 08/14/2018 with MD Brenda Bain   Next Appointment: noted to f/u in 4 months   Previous Refill Encounters: 07/13/2018 per MD Brenda Bain #90    Requested Prescriptions     Pending Prescriptions Disp Refills    simvastatin (ZOCOR) 80 mg tablet 90 Tab 3     Sig: Take 1 Tab by mouth daily.

## 2018-11-26 ENCOUNTER — TELEPHONE (OUTPATIENT)
Dept: INTERNAL MEDICINE CLINIC | Age: 83
End: 2018-11-26

## 2018-11-26 DIAGNOSIS — E11.9 TYPE 2 DIABETES MELLITUS WITHOUT COMPLICATION, WITHOUT LONG-TERM CURRENT USE OF INSULIN (HCC): Primary | ICD-10-CM

## 2018-11-26 NOTE — TELEPHONE ENCOUNTER
Pt called and wants to talk with Dr Guzman Vernon. She states she has not checked her blood sugar in 2-4 months and she is diabetic. She had a stroke in Sept and lives alone. She wants a new meter best I could get out of her, the kind that \"you don't prick your finger. \"  She wants one that gives an instant reading. Please advise her at [phone].

## 2018-11-27 NOTE — TELEPHONE ENCOUNTER
Pt given this information and appt scheduled in January. Pt still does not understand RM does not think she needs to check her sugar. Says she likes knowing everyday what it is.

## 2018-12-05 ENCOUNTER — OFFICE VISIT (OUTPATIENT)
Dept: INTERNAL MEDICINE CLINIC | Age: 83
End: 2018-12-05

## 2018-12-05 ENCOUNTER — HOSPITAL ENCOUNTER (OUTPATIENT)
Dept: LAB | Age: 83
Discharge: HOME | End: 2018-12-05
Payer: MEDICARE

## 2018-12-05 VITALS
WEIGHT: 140.8 LBS | TEMPERATURE: 98.4 F | HEART RATE: 58 BPM | SYSTOLIC BLOOD PRESSURE: 138 MMHG | HEIGHT: 59 IN | RESPIRATION RATE: 16 BRPM | OXYGEN SATURATION: 97 % | DIASTOLIC BLOOD PRESSURE: 74 MMHG | BODY MASS INDEX: 28.39 KG/M2

## 2018-12-05 DIAGNOSIS — H81.10 BENIGN PAROXYSMAL VERTIGO, UNSPECIFIED LATERALITY: ICD-10-CM

## 2018-12-05 DIAGNOSIS — E11.9 TYPE 2 DIABETES MELLITUS WITHOUT COMPLICATION, WITHOUT LONG-TERM CURRENT USE OF INSULIN (HCC): ICD-10-CM

## 2018-12-05 DIAGNOSIS — F41.9 CHRONIC ANXIETY: ICD-10-CM

## 2018-12-05 DIAGNOSIS — F41.9 CHRONIC ANXIETY: Primary | ICD-10-CM

## 2018-12-05 LAB
ALBUMIN SERPL-MCNC: 4.2 G/DL (ref 3.4–5)
ALBUMIN/GLOB SERPL: 1.1 {RATIO} (ref 0.8–1.7)
ALP SERPL-CCNC: 95 U/L (ref 45–117)
ALT SERPL-CCNC: 20 U/L (ref 13–56)
ANION GAP SERPL CALC-SCNC: 8 MMOL/L (ref 3–18)
AST SERPL-CCNC: 24 U/L (ref 15–37)
BILIRUB SERPL-MCNC: 0.3 MG/DL (ref 0.2–1)
BUN SERPL-MCNC: 13 MG/DL (ref 7–18)
BUN/CREAT SERPL: 14 (ref 12–20)
CALCIUM SERPL-MCNC: 9.4 MG/DL (ref 8.5–10.1)
CHLORIDE SERPL-SCNC: 109 MMOL/L (ref 100–108)
CO2 SERPL-SCNC: 25 MMOL/L (ref 21–32)
CREAT SERPL-MCNC: 0.92 MG/DL (ref 0.6–1.3)
EST. AVERAGE GLUCOSE BLD GHB EST-MCNC: 151 MG/DL
GLOBULIN SER CALC-MCNC: 3.8 G/DL (ref 2–4)
GLUCOSE SERPL-MCNC: 114 MG/DL (ref 74–99)
HBA1C MFR BLD: 6.9 % (ref 4.2–5.6)
POTASSIUM SERPL-SCNC: 4.5 MMOL/L (ref 3.5–5.5)
PROT SERPL-MCNC: 8 G/DL (ref 6.4–8.2)
SODIUM SERPL-SCNC: 142 MMOL/L (ref 136–145)

## 2018-12-05 PROCEDURE — 83036 HEMOGLOBIN GLYCOSYLATED A1C: CPT

## 2018-12-05 PROCEDURE — 82043 UR ALBUMIN QUANTITATIVE: CPT

## 2018-12-05 PROCEDURE — 80053 COMPREHEN METABOLIC PANEL: CPT

## 2018-12-05 RX ORDER — DEXTROMETHORPHAN HYDROBROMIDE, GUAIFENESIN 5; 100 MG/5ML; MG/5ML
650 LIQUID ORAL EVERY 8 HOURS
COMMUNITY
End: 2019-02-11

## 2018-12-05 NOTE — PROGRESS NOTES
1. Have you been to the ER, urgent care clinic or hospitalized since your last visit? NO 
      
 
2. Have you seen or consulted any other health care providers outside of the [address] [place] [address] since your last visit (Include any pap smears or colon screening)? NO Do you have an Advanced Directive? NO Would you like information on Advanced Directives?  NO

## 2018-12-05 NOTE — PROGRESS NOTES
Rajesh Barber 5/17/1926, is a 80 y.o. female, who is seen today for anxiety, imbalance, fearfulness. She says she is afraid of a lot of things, she is anxious, her balance is not as good as it used to be but she has not had any falls. After reading about gabapentin she thinks that is the cause of these symptoms. She had these symptoms before she was on gabapentin as far as I recall. She is also overly concerned about her glucose, she wants to check it at home frequently even though she has been well controlled and uses only metformin, I told her several times in the past checking glucose at home is not helpful to either of us. We adjust her medicine based on hemoglobin A1c and symptoms. Past Medical History:  
Diagnosis Date  Aortic valve disorders Mild AI; no mass or thrombus  Essential hypertension, benign Stable  Hypercholesterolemia  Hypertension  Other and unspecified hyperlipidemia Per PMD/ on Zocor  Type II or unspecified type diabetes mellitus without mention of complication, not stated as uncontrolled Current Outpatient Medications Medication Sig Dispense Refill  acetaminophen (TYLENOL) 650 mg TbER Take 650 mg by mouth every eight (8) hours.  simvastatin (ZOCOR) 80 mg tablet Take 1 Tab by mouth daily. 90 Tab 3  
 meclizine (ANTIVERT) 25 mg tablet Take 1 Tab by mouth three (3) times daily as needed. 270 Tab 0  clopidogrel (PLAVIX) 75 mg tab 1 po qday 30 Tab 11  
 cholecalciferol, vitamin D3, (VITAMIN D3) 2,000 unit tab 1 tablet by mouth every day 100 Tab prn  
 gabapentin (NEURONTIN) 100 mg capsule Take 1 Cap by mouth three (3) times daily. 270 Cap 2  
 metFORMIN ER (GLUCOPHAGE XR) 500 mg tablet Take 1 Tab by mouth daily (with breakfast). 90 Tab 2  
 magnesium hydroxide (KATE MILK OF MAGNESIA) 400 mg/5 mL suspension Take 30 mL by mouth daily as needed for Constipation.  ADULT ASPIRIN EC LOW STRENGTH PO Take 81 mg by mouth daily. Visit Vitals /74 (BP 1 Location: Right arm, BP Patient Position: Sitting) Pulse (!) 58 Temp 98.4 °F (36.9 °C) (Oral) Resp 16 Ht 4' 11\" (1.499 m) Wt 140 lb 12.8 oz (63.9 kg) SpO2 97% BMI 28.44 kg/m² Carotids are 2+ without bruits. Lungs are clear to percussion. Good breath sounds with no wheezing or crackles. Heart reveals a regular rhythm with normal S1 and S2 no murmur gallop click or rub. Apical impulse is not palpable. Abdomen is soft and nontender with no hepatosplenic megaly or masses and no bruits. Extremities reveal no clubbing cyanosis or edema. Pulses are 2+ except diminished in the feet. Gait is slow but not terribly unsteady. Romberg is absent. Assessment: #1. Chronic anxiety is her major symptom leading to fearfulness. After I did explain to her that I doubt that gabapentin is causing any of her symptoms but since she is so sure of this she will simply stop gabapentin which is only used at 100 mg 3 times a day, she says she has no pain. I told her there should be no withdrawal symptoms from this low-dose being stopped abruptly. We will see how much of her symptomatology improves. #2.  Diabetes, she is fasting once her glucose checked again today. It is about time to check anyway so we will check CMP and hemoglobin A1c and notify her of those results. We will cancel her upcoming lab appointment but I recommended she keep her upcoming office visit appointment. #3.  Her balance is not quite normal but she does not seem to have vertigo at this time, she has had that in the past.  It is most likely that her balance is age-related, though there could be a component related to use of gabapentin. She will continue all of her other medication including metformin 500 mg each morning. Patrick Mandel, [address] Please note:   This document has been produced using voice recognition software. Unrecognized errors in transcription may be present.

## 2018-12-06 ENCOUNTER — TELEPHONE (OUTPATIENT)
Dept: INTERNAL MEDICINE CLINIC | Age: 83
End: 2018-12-06

## 2018-12-06 LAB
CREAT UR-MCNC: 71 MG/DL (ref 30–125)
MICROALBUMIN UR-MCNC: 0.74 MG/DL (ref 0–3)
MICROALBUMIN/CREAT UR-RTO: 10 MG/G (ref 0–30)

## 2018-12-06 NOTE — TELEPHONE ENCOUNTER
----- Message from Sharan Shanks MD sent at 12/6/2018  7:19 AM EST -----  Please let the patient know that her A1c is still very good at 6.9 and her fasting glucose is well controlled, 109 in the office yesterday.

## 2018-12-06 NOTE — TELEPHONE ENCOUNTER
Chief Complaint   Patient presents with    Labs     done 12-05-18 per Dr Silvia Dhaliwal     I left a voice message to return my call. The patient did return my call and has been informed of results, and understands all.

## 2018-12-06 NOTE — PROGRESS NOTES
Please let the patient know that her A1c is still very good at 6.9 and her fasting glucose is well controlled, 109 in the office yesterday.

## 2019-01-02 ENCOUNTER — OFFICE VISIT (OUTPATIENT)
Dept: INTERNAL MEDICINE CLINIC | Age: 84
End: 2019-01-02

## 2019-01-02 VITALS
TEMPERATURE: 97.9 F | BODY MASS INDEX: 28.63 KG/M2 | HEIGHT: 59 IN | WEIGHT: 142 LBS | RESPIRATION RATE: 14 BRPM | OXYGEN SATURATION: 96 % | SYSTOLIC BLOOD PRESSURE: 128 MMHG | HEART RATE: 87 BPM | DIASTOLIC BLOOD PRESSURE: 64 MMHG

## 2019-01-02 DIAGNOSIS — R42 CHRONIC VERTIGO: ICD-10-CM

## 2019-01-02 DIAGNOSIS — K64.4 BLEEDING EXTERNAL HEMORRHOIDS: Primary | ICD-10-CM

## 2019-01-02 NOTE — PROGRESS NOTES
1. Have you been to the ER, urgent care clinic or hospitalized since your last visit? NO 
      
 
2. Have you seen or consulted any other health care providers outside of the [address] [place] [address] since your last visit (Include any pap smears or colon screening)? NO Do you have an Advanced Directive? YES Would you like information on Advanced Directives?  NO

## 2019-01-02 NOTE — PROGRESS NOTES
Lalito Garner 5/17/1926, is a 80 y.o. female, who is seen today for intermittent rectal bleeding over the last couple of weeks. She had hemorrhoid surgery many years ago and has not had a colonoscopy ever apparently. She notes that there is some bright red blood on the toilet paper some days though when she was out of town recently it was not occurring. She rarely sees any blood on the stool. She is having no pain when she has a bowel movement and there is no burning. She continues to be quite dizzy and complains of that again today but she says she does use meclizine. She has had some discomfort in the back of her neck. Her neurologist recently started her on clopidogrel. Past Medical History:  
Diagnosis Date  Aortic valve disorders Mild AI; no mass or thrombus  Essential hypertension, benign Stable  Hypercholesterolemia  Hypertension  Other and unspecified hyperlipidemia Per PMD/ on Zocor  Type II or unspecified type diabetes mellitus without mention of complication, not stated as uncontrolled Current Outpatient Medications Medication Sig Dispense Refill  acetaminophen (TYLENOL) 650 mg TbER Take 650 mg by mouth every eight (8) hours.  simvastatin (ZOCOR) 80 mg tablet Take 1 Tab by mouth daily. 90 Tab 3  
 meclizine (ANTIVERT) 25 mg tablet Take 1 Tab by mouth three (3) times daily as needed. 270 Tab 0  clopidogrel (PLAVIX) 75 mg tab 1 po qday 30 Tab 11  
 cholecalciferol, vitamin D3, (VITAMIN D3) 2,000 unit tab 1 tablet by mouth every day 100 Tab prn  metFORMIN ER (GLUCOPHAGE XR) 500 mg tablet Take 1 Tab by mouth daily (with breakfast). 90 Tab 2  
 magnesium hydroxide (KATE MILK OF MAGNESIA) 400 mg/5 mL suspension Take 30 mL by mouth daily as needed for Constipation.  ADULT ASPIRIN EC LOW STRENGTH PO Take 81 mg by mouth daily. Visit Vitals /64 (BP 1 Location: Left arm, BP Patient Position: Sitting) Pulse 87 Temp 97.9 °F (36.6 °C) (Oral) Resp 14 Ht 4' 11\" (1.499 m) Wt 142 lb (64.4 kg) SpO2 96% BMI 28.68 kg/m² Neck is very supple. Carotids are 2+ without bruits. Billy-Hallpike is normal.  Romberg is normal.  Heart reveals a regular rhythm with normal S1 and S2 no murmur gallop click or rub. Rectal exam reveals 2 raw appearing external hemorrhoids, no active bleeding. Rest of the rectal exam was normal with light brown stool, Hemoccult negative. Assessment: The blood almost certainly is coming from these hemorrhoids, she sees it on the toilet paper and there are no mucosal abnormalities that are palpable. No blood inside the rectum and negative stool makes cancer or inflammatory bowel disease quite unlikely. She will treat the hemorrhoids symptomatically, she may see more blood because of being on clopidogrel now. #2. Neck pain but very supple, no medication is indicated. #3.  Chronic sense of dizziness, she will continue meclizine which does not make her sleepy and does seem to help a little bit with her balance. Patrick Gan, [address] Please note: This document has been produced using voice recognition software. Unrecognized errors in transcription may be present.

## 2019-01-11 ENCOUNTER — OFFICE VISIT (OUTPATIENT)
Dept: NEUROLOGY | Age: 84
End: 2019-01-11

## 2019-01-11 VITALS
OXYGEN SATURATION: 97 % | BODY MASS INDEX: 28.75 KG/M2 | SYSTOLIC BLOOD PRESSURE: 132 MMHG | HEIGHT: 59 IN | DIASTOLIC BLOOD PRESSURE: 70 MMHG | TEMPERATURE: 96.4 F | WEIGHT: 142.6 LBS | RESPIRATION RATE: 16 BRPM | HEART RATE: 75 BPM

## 2019-01-11 DIAGNOSIS — E11.9 TYPE 2 DIABETES MELLITUS WITHOUT COMPLICATION, WITHOUT LONG-TERM CURRENT USE OF INSULIN (HCC): ICD-10-CM

## 2019-01-11 DIAGNOSIS — E78.5 DYSLIPIDEMIA, GOAL LDL BELOW 70: ICD-10-CM

## 2019-01-11 DIAGNOSIS — I10 ESSENTIAL HYPERTENSION WITH GOAL BLOOD PRESSURE LESS THAN 140/90: ICD-10-CM

## 2019-01-11 DIAGNOSIS — Z79.899 ON STATIN THERAPY: ICD-10-CM

## 2019-01-11 DIAGNOSIS — Z79.02 ANTIPLATELET OR ANTITHROMBOTIC LONG-TERM USE: ICD-10-CM

## 2019-01-11 DIAGNOSIS — I63.30 CEREBROVASCULAR ACCIDENT (CVA) DUE TO THROMBOSIS OF CEREBRAL ARTERY (HCC): Primary | ICD-10-CM

## 2019-01-11 RX ORDER — ASPIRIN 325 MG
325 TABLET ORAL DAILY
Qty: 30 TAB | Refills: 3 | Status: SHIPPED | OUTPATIENT
Start: 2019-01-11 | End: 2019-02-11

## 2019-01-11 RX ORDER — GABAPENTIN 100 MG/1
CAPSULE ORAL
Refills: 1 | COMMUNITY
Start: 2018-11-25 | End: 2019-02-11

## 2019-01-11 NOTE — PATIENT INSTRUCTIONS
Patient instructions:  Start aspirin 325 mg, stop aspirin 81 mg  Stop clopidogrel/ Plavix  Continue to follow up with primary care provider for management of stroke risk factors  Follow up as-needed

## 2019-01-11 NOTE — PROGRESS NOTES
[facility]  [address], [address], [place], [address]  [address] [place], [address]  Office:  [phone]  Fax: [phone]  Chief Complaint   Patient presents with    Follow-up    Cerebrovascular Accident       HPI: Renetta Freedman is a 79-year-old right-handed female who presents in follow-up for dizziness. She was last seen at the practice by Dr. Arelis Castillo in September 2018. She had an MRI that was consistent with subacute infarct in the nayeli. EEG and carotid Doppler were unrevealing. She was on aspirin 81 mg and statin. Plavix was added at that time for dual antiplatelet therapy. Blood pressure control was encouraged. She was to follow-up in 3 months for evaluation. She reports doing well. Sometimes she has the dizziness upon awakening before taking her medications. She takes meclizine which is helping. Denies spinning sensation. Denies dizziness during the day, lightheadedness, stumbling, or falls. She has some posterior neck discomfort for which she takes acetaminophen. She continues on aspirin and clopidogrel. She had mentioned increasing her aspirin dose which she thought would help for her neck discomfort but then she is not sure and thinks maybe it was her acetaminophen/Tylenol. She mentions wanting something for her nerves. Sometimes she can feel anxious or nervous such as when going to appointments. She stays active with walking in the morning at [facility] 2-3 times a week. She discusses her travels to different places. She was with her son in [place] over the holidays.      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     Essential hypertension, benign     Stable     Hypercholesterolemia     Hypertension     Other and unspecified hyperlipidemia     Per PMD/ on Zocor    Type II or unspecified type diabetes mellitus without mention of complication, not stated as uncontrolled        Past Surgical History:   Procedure Laterality Date    HX HEENT      bilateral cataract surgery    HX HEMORRHOIDECTOMY      HX ORTHOPAEDIC      bunionectomy    HX OTHER SURGICAL      Esophageal dilatation       Current Outpatient Medications   Medication Sig Dispense Refill    gabapentin (NEURONTIN) 100 mg capsule TAKE 1 CAPSULE BY MOUTH THREE TIMES A DAY  1    aspirin (ASPIRIN) 325 mg tablet Take 1 Tab by mouth daily. 30 Tab 3    acetaminophen (TYLENOL) 650 mg TbER Take 650 mg by mouth every eight (8) hours.  simvastatin (ZOCOR) 80 mg tablet Take 1 Tab by mouth daily. 90 Tab 3    meclizine (ANTIVERT) 25 mg tablet Take 1 Tab by mouth three (3) times daily as needed. 270 Tab 0    cholecalciferol, vitamin D3, (VITAMIN D3) 2,000 unit tab 1 tablet by mouth every day 100 Tab prn    metFORMIN ER (GLUCOPHAGE XR) 500 mg tablet Take 1 Tab by mouth daily (with breakfast). 90 Tab 2    magnesium hydroxide (KATE MILK OF MAGNESIA) 400 mg/5 mL suspension Take 30 mL by mouth daily as needed for Constipation. No Known Allergies    Social History     Tobacco Use    Smoking status: Never Smoker    Smokeless tobacco: Never Used   Substance Use Topics    Alcohol use: No    Drug use: No       Family History   Problem Relation Age of Onset    Diabetes Father     Heart Disease Father     Heart Attack Sister         62s    Hypertension Other         Positive family history for essential hypertension    Elevated Lipids Other        Review of Systems:  GENERAL: Denies fever or fatigue  CARDIAC: No CP or SOB  PULMONARY: No cough of SOB  MUSCULOSKELETAL: No new joint pain. + posterior neck discomfort. NEURO: SEE HPI    Physical Examination:  Visit Vitals  /70 (BP 1 Location: Left arm, BP Patient Position: Sitting)   Pulse 75   Temp 96.4 °F (35.8 °C) (Temporal)   Resp 16   Ht 4' 11\" (1.499 m)   Wt 64.7 kg (142 lb 9.6 oz)   SpO2 97%   BMI 28.80 kg/m²       Alert, in NAD.  Mood and affect normal. No dysarthria. Heart is regular. Respirations regular and unlabored. Lungs clear. Oriented x3, EOMs are full, PERRL, no nystagmus or ptosis. No facial asymmetry. Tongue midline. Strength and tone are normal. No pronator drift. Finger to nose intact. Romberg negative. Steady gait. Impression/Plan: This is a 80-year-old right-handed female who presents in follow-up for dizziness and stroke. She has risk factors to include hypertension, diabetes, and dyslipidemia. She is doing well and dizziness seems to be improving. We discussed her MRI results again and that her stroke in the nayeli of the brain stem is likely responsible for her symptoms of dizziness. Discussed the importance of management of stroke risk factors and discussed her personal risk factors of hypertension, diabetes, and dyslipidemia. Continue to monitor for blood pressure normalization. Her blood pressure is slightly above normal range at her visit. Glycemic control. Lipid control with LDL goal of 70. At this point I would reduce dual antiplatelet therapy to single antiplatelet therapy by discontinuing clopidogrel. Increase aspirin to full dose 325 mg. She denies easy bruising or bleeding. Discussed the risks of bleeding on dual antiplatelet therapy and at this point without recurrence of symptoms would go to single antiplatelet therapy. She is doing very well in terms of her functional status and ambulates with a steady gait. I do not feel like she needs physical therapy at this time and she agrees. She will continue to take acetaminophen as needed for her neck discomfort. I discussed the risks of as needed medications for her symptoms of anxiety which seems to be occasional, and I would defer this to her primary care provider. We discussed the signs and symptoms of stroke and reasons to call 911 and present to the emergency room. Written instructions were given on her medication changes.   She will follow-up on an as-needed basis. Diagnoses and all orders for this visit:    1. 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 Antiplatelet or antithrombotic long-term use    3. On statin therapy    4. Dyslipidemia, goal LDL below 70    5. Type 2 diabetes mellitus without complication, without long-term current use of insulin ([facility] [address]    6. Essential hypertension with goal blood pressure less than 140/90    Other orders  -     aspirin (ASPIRIN) 325 mg tablet; Take 1 Tab by mouth daily. Total time 35 minutes with 25 minutes spent in counseling. Signed By: Janiya Mora NP      This note will not be viewable in [address] PLEASE NOTE:   Portions of this document may have been produced using voice recognition software. Unrecognized errors in transcription may be present.

## 2019-01-23 ENCOUNTER — HOSPITAL ENCOUNTER (OUTPATIENT)
Dept: LAB | Age: 84
Discharge: HOME | End: 2019-01-23
Payer: MEDICARE

## 2019-01-23 ENCOUNTER — OFFICE VISIT (OUTPATIENT)
Dept: INTERNAL MEDICINE CLINIC | Age: 84
End: 2019-01-23

## 2019-01-23 VITALS
BODY MASS INDEX: 29.11 KG/M2 | TEMPERATURE: 98.3 F | HEIGHT: 59 IN | HEART RATE: 73 BPM | OXYGEN SATURATION: 98 % | DIASTOLIC BLOOD PRESSURE: 74 MMHG | WEIGHT: 144.4 LBS | SYSTOLIC BLOOD PRESSURE: 136 MMHG | RESPIRATION RATE: 12 BRPM

## 2019-01-23 DIAGNOSIS — R42 CHRONIC VERTIGO: ICD-10-CM

## 2019-01-23 DIAGNOSIS — E11.9 TYPE 2 DIABETES MELLITUS WITHOUT COMPLICATION, WITHOUT LONG-TERM CURRENT USE OF INSULIN (HCC): ICD-10-CM

## 2019-01-23 DIAGNOSIS — I10 ESSENTIAL HYPERTENSION WITH GOAL BLOOD PRESSURE LESS THAN 140/90: ICD-10-CM

## 2019-01-23 DIAGNOSIS — E11.9 TYPE 2 DIABETES MELLITUS WITHOUT COMPLICATION, WITHOUT LONG-TERM CURRENT USE OF INSULIN (HCC): Primary | ICD-10-CM

## 2019-01-23 DIAGNOSIS — I63.30 CEREBROVASCULAR ACCIDENT (CVA) DUE TO THROMBOSIS OF CEREBRAL ARTERY (HCC): ICD-10-CM

## 2019-01-23 LAB
ANION GAP SERPL CALC-SCNC: 3 MMOL/L (ref 3–18)
BUN SERPL-MCNC: 13 MG/DL (ref 7–18)
BUN/CREAT SERPL: 15 (ref 12–20)
CALCIUM SERPL-MCNC: 9.2 MG/DL (ref 8.5–10.1)
CHLORIDE SERPL-SCNC: 113 MMOL/L (ref 100–108)
CO2 SERPL-SCNC: 26 MMOL/L (ref 21–32)
CREAT SERPL-MCNC: 0.89 MG/DL (ref 0.6–1.3)
EST. AVERAGE GLUCOSE BLD GHB EST-MCNC: 148 MG/DL
GLUCOSE SERPL-MCNC: 133 MG/DL (ref 74–99)
HBA1C MFR BLD: 6.8 % (ref 4.2–5.6)
POTASSIUM SERPL-SCNC: 4.1 MMOL/L (ref 3.5–5.5)
SODIUM SERPL-SCNC: 142 MMOL/L (ref 136–145)

## 2019-01-23 PROCEDURE — 83036 HEMOGLOBIN GLYCOSYLATED A1C: CPT

## 2019-01-23 PROCEDURE — 80048 BASIC METABOLIC PNL TOTAL CA: CPT

## 2019-01-23 NOTE — PROGRESS NOTES
Yadira Dietrich 5/17/1926, is a 80 y.o. female, who is seen today for reevaluation of diabetes, vertigo, recent cervical spine pain, labile hypertension hyperlipidemia overweight. She is back on gabapentin prescribed elsewhere, she says she thinks it for diabetes. She also she should be checking her glucose regularly with 1 of the new monitoring devices. Past Medical History:  
Diagnosis Date  Aortic valve disorders Mild AI; no mass or thrombus  Essential hypertension, benign Stable  Hypercholesterolemia  Hypertension  Other and unspecified hyperlipidemia Per PMD/ on Zocor  Type II or unspecified type diabetes mellitus without mention of complication, not stated as uncontrolled Current Outpatient Medications Medication Sig Dispense Refill  gabapentin (NEURONTIN) 100 mg capsule TAKE 1 CAPSULE BY MOUTH THREE TIMES A DAY  1  
 aspirin (ASPIRIN) 325 mg tablet Take 1 Tab by mouth daily. 30 Tab 3  
 acetaminophen (TYLENOL) 650 mg TbER Take 650 mg by mouth every eight (8) hours.  simvastatin (ZOCOR) 80 mg tablet Take 1 Tab by mouth daily. 90 Tab 3  
 meclizine (ANTIVERT) 25 mg tablet Take 1 Tab by mouth three (3) times daily as needed. 270 Tab 0  cholecalciferol, vitamin D3, (VITAMIN D3) 2,000 unit tab 1 tablet by mouth every day 100 Tab prn  metFORMIN ER (GLUCOPHAGE XR) 500 mg tablet Take 1 Tab by mouth daily (with breakfast). 90 Tab 2  
 magnesium hydroxide (KATE MILK OF MAGNESIA) 400 mg/5 mL suspension Take 30 mL by mouth daily as needed for Constipation. Visit Vitals /74 Pulse 73 Temp 98.3 °F (36.8 °C) (Oral) Resp 12 Ht 4' 11\" (1.499 m) Wt 144 lb 6.4 oz (65.5 kg) SpO2 98% BMI 29.17 kg/m² Carotids are 2+ without bruits. Neck does have some limitation of range of motion with stiffness in the posterior neck. Lungs are clear to percussion. Good breath sounds with no wheezing or crackles.   Heart reveals a regular rhythm with normal S1-S2 no murmur gallop click or rub. Apical impulse is not palpable. Abdomen is soft and nontender with no hepatosplenomegaly or masses and no bruits. Extremities reveal no clubbing cyanosis or edema. Pulses are intact, diminished in the feet. Assessment: #1.  Diabetes has been well controlled, we will check fasting BMP and hemoglobin A1c now and call those results to her. She will continue diabetic diet and metformin 500 mg each morning. I told her she does not need to be monitoring glucose at home, it is not helpful to her or me in managing her well-controlled diabetes. #2.  Labile hypertension with blood pressure down into the normal range currently. It was higher when she first came into the office today. She will continue no added salt diet. #3.  History of CVA, she will continue aspirin and Plavix. #4.  Hyperlipidemia has been doing well, she will continue simvastatin 80 mg each evening and we will recheck lipids in about 3 months. #5.  Frequent episodes of vertigo doing better currently. She will stop gabapentin since she is not having neuropathic pain, I told her this before. Follow-up 4 months with lab Patrick Lombardo Head, [address] Please note: This document has been produced using voice recognition software. Unrecognized errors in transcription may be present.

## 2019-01-24 ENCOUNTER — TELEPHONE (OUTPATIENT)
Dept: INTERNAL MEDICINE CLINIC | Age: 84
End: 2019-01-24

## 2019-01-24 NOTE — TELEPHONE ENCOUNTER
----- Message from Ciarra Tijerina MD sent at 1/24/2019  7:13 AM EST -----  Please notify the patient that her hemoglobin A1c is still excellent at 6.8 and glucose 133. She should continue current medical regimen and does not need to be checking her glucose at home, that just is not helpful since she is not on insulin.

## 2019-01-25 NOTE — TELEPHONE ENCOUNTER
Patient contacted, patient identified with two identifiers (Name & ). Patient is aware of results per DR. Castro Puga.

## 2019-02-07 ENCOUNTER — DOCUMENTATION ONLY (OUTPATIENT)
Dept: NEUROLOGY | Age: 84
End: 2019-02-07

## 2019-02-07 NOTE — PROGRESS NOTES
Chart review reveals scheduled follow-up to discuss CVA; previously seen by Purvi Garcia NP on 1/11/19.

## 2019-02-11 ENCOUNTER — OFFICE VISIT (OUTPATIENT)
Dept: NEUROLOGY | Age: 84
End: 2019-02-11

## 2019-02-11 VITALS
OXYGEN SATURATION: 97 % | DIASTOLIC BLOOD PRESSURE: 60 MMHG | HEIGHT: 59 IN | BODY MASS INDEX: 29.11 KG/M2 | SYSTOLIC BLOOD PRESSURE: 110 MMHG | WEIGHT: 144.4 LBS | RESPIRATION RATE: 18 BRPM | TEMPERATURE: 98.7 F | HEART RATE: 90 BPM

## 2019-02-11 DIAGNOSIS — I63.30 CEREBROVASCULAR ACCIDENT (CVA) DUE TO THROMBOSIS OF CEREBRAL ARTERY (HCC): Primary | ICD-10-CM

## 2019-02-11 RX ORDER — ASPIRIN 81 MG/1
81 TABLET ORAL DAILY
Qty: 100 TAB | Refills: 11
Start: 2019-02-11

## 2019-02-11 RX ORDER — CLOPIDOGREL BISULFATE 75 MG/1
TABLET ORAL
Qty: 30 TAB | Refills: 11 | Status: SHIPPED | OUTPATIENT
Start: 2019-02-11 | End: 2020-04-02 | Stop reason: SDUPTHER

## 2019-02-11 NOTE — PROGRESS NOTES
[facility]  [address], [address], [place], [address]  [address] [place], [address]  Office:  [phone]  Fax: [phone]      Current Outpatient Medications   Medication Sig Dispense Refill    simvastatin (ZOCOR) 80 mg tablet Take 1 Tab by mouth daily. 90 Tab 3    meclizine (ANTIVERT) 25 mg tablet Take 1 Tab by mouth three (3) times daily as needed. 270 Tab 0    metFORMIN ER (GLUCOPHAGE XR) 500 mg tablet Take 1 Tab by mouth daily (with breakfast). 90 Tab 2    magnesium hydroxide (KATE MILK OF MAGNESIA) 400 mg/5 mL suspension Take 30 mL by mouth daily as needed for Constipation.  gabapentin (NEURONTIN) 100 mg capsule TAKE 1 CAPSULE BY MOUTH THREE TIMES A DAY  1    aspirin (ASPIRIN) 325 mg tablet Take 1 Tab by mouth daily. 30 Tab 3    acetaminophen (TYLENOL) 650 mg TbER Take 650 mg by mouth every eight (8) hours.  cholecalciferol, vitamin D3, (VITAMIN D3) 2,000 unit tab 1 tablet by mouth every day 100 Tab prn     No Known Allergies  Social History     Tobacco Use    Smoking status: Never Smoker    Smokeless tobacco: Never Used   Substance Use Topics    Alcohol use: No    Drug use: No     Patient returns today for follow-up. She was last seen by our nurse practitioner, Nancy Holliday for general stroke follow-up. Her aspirin was increased to full dose and she is maintained on Plavix. She had a pontine stroke. She has intermittent dizziness. She was relegated to as needed follow-up. She returns today as there was some acute confusion about her medications. She indicates that she and Nancy Holliday discussed increasing her aspirin to a dose of 325 mg. This was secondary to some neck pain and as I understand from reading the note the patient was wondering if an increased dose of aspirin would help with the neck pain. Nancy Holliday agreed to that. In any regard the after visit summary the patient provides me today was quite confusing.   It has several doses of aspirin on it and in any regard is to is quite confusing. We reviewed that today at length. We discussed that I think 325 mg of aspirin along with Plavix is probably a bit much in terms of bleeding risk. We have decided to stay with Plavix 75 mg daily and aspirin 81 mg daily. Continue to modify modifiable risk factors. She continues to get some intermittent dizziness and some headache here there and we discussed that the dizziness is not out of the ordinary after having had a pontine stroke. She is not having any focal deficits. Her examination today is completely normal and I reassured her about that. She certainly looks good for a lady of 80 years and she certainly looks much much younger than her stated age. She is a very friendly lady and I appreciated her coming in today to discuss things. At this point I printed out a new after visit summary that has Plavix and 81 mg of aspirin on there and I actually wrote out directions and put those in 20-sized type font and molded it to say continue taking Plavix 75 mg and aspirin 81 mg to help quell any confusion.   Follow as needed      Total time: 30 min   Counseling / coordination time: 20 min   > 50% counseling / coordination?: Yes re: as documented above    Eamon Reilly MD      .

## 2019-02-11 NOTE — PATIENT INSTRUCTIONS
Continue taking Plavix (clopidogrel) 75 mg 1 tablet daily and aspirin 81 mg daily to help prevent strokes    As long as you continue to do well keep taking good care of yourself and continue to be active and see me as you need

## 2019-02-11 NOTE — PROGRESS NOTES
Cosme Schaumann is a 80 y.o. female in today for follow-up to discuss CVA and new concerns of HA and intermittent dizziness; previously seen by Joe Jewell NP on 1/11/19. Learning assessment previously completed 2/11/2015; primary language is [place]. 1. Have you been to the ER, urgent care clinic since your last visit? Hospitalized since your last visit? No    2. Have you seen or consulted any other health care providers outside of the [address] [place] [address] since your last visit? Include any pap smears or colon screening.  No      Vitals:    02/11/19 0917 02/11/19 0930 02/11/19 0932 02/11/19 0933   BP: 114/60 128/60 116/60 110/60   BP 1 Location: Left arm Left arm Left arm Left arm   BP Patient Position: Sitting Supine Sitting Standing   Pulse: 88 81 84 90   Resp: 18      Temp: 98.7 °F (37.1 °C)      TempSrc: Oral      SpO2: 97%      Weight: 65.5 kg (144 lb 6.4 oz)      Height: 4' 11\" (1.499 m)

## 2019-02-15 RX ORDER — MECLIZINE HYDROCHLORIDE 25 MG/1
25 TABLET ORAL
Qty: 270 TAB | Refills: 0 | Status: SHIPPED | OUTPATIENT
Start: 2019-02-15 | End: 2019-05-28 | Stop reason: SDUPTHER

## 2019-02-15 NOTE — TELEPHONE ENCOUNTER
Last Visit: 1/23/19  Next Appt: 5/23/19  Previous Refill Encounter: 10/+0    Requested Prescriptions     Pending Prescriptions Disp Refills    meclizine (ANTIVERT) 25 mg tablet 270 Tab 0     Sig: Take 1 Tab by mouth three (3) times daily as needed.

## 2019-03-15 ENCOUNTER — OFFICE VISIT (OUTPATIENT)
Dept: INTERNAL MEDICINE CLINIC | Age: 84
End: 2019-03-15

## 2019-03-15 VITALS
DIASTOLIC BLOOD PRESSURE: 66 MMHG | BODY MASS INDEX: 28.83 KG/M2 | HEIGHT: 59 IN | TEMPERATURE: 98.3 F | RESPIRATION RATE: 12 BRPM | HEART RATE: 80 BPM | OXYGEN SATURATION: 96 % | SYSTOLIC BLOOD PRESSURE: 124 MMHG | WEIGHT: 143 LBS

## 2019-03-15 DIAGNOSIS — R10.11 RIGHT UPPER QUADRANT PAIN: ICD-10-CM

## 2019-03-15 DIAGNOSIS — R10.2 PELVIC PAIN: ICD-10-CM

## 2019-03-15 DIAGNOSIS — K64.4 BLEEDING EXTERNAL HEMORRHOIDS: Primary | ICD-10-CM

## 2019-03-15 DIAGNOSIS — E11.8 TYPE 2 DIABETES MELLITUS WITH COMPLICATION, WITHOUT LONG-TERM CURRENT USE OF INSULIN (HCC): ICD-10-CM

## 2019-03-15 DIAGNOSIS — E11.9 TYPE 2 DIABETES MELLITUS WITHOUT COMPLICATION, WITHOUT LONG-TERM CURRENT USE OF INSULIN (HCC): ICD-10-CM

## 2019-03-15 LAB
BILIRUB UR QL STRIP: NORMAL
GLUCOSE UR-MCNC: NEGATIVE MG/DL
KETONES P FAST UR STRIP-MCNC: NEGATIVE MG/DL
PH UR STRIP: 5.5 [PH] (ref 4.6–8)
PROT UR QL STRIP: NEGATIVE
SP GR UR STRIP: 1.01 (ref 1–1.03)
UA UROBILINOGEN AMB POC: NORMAL (ref 0.2–1)
URINALYSIS CLARITY POC: CLEAR
URINALYSIS COLOR POC: YELLOW
URINE BLOOD POC: NEGATIVE
URINE LEUKOCYTES POC: NORMAL
URINE NITRITES POC: NEGATIVE

## 2019-03-15 NOTE — PROGRESS NOTES
Garrick Haywood 5/17/1926, is a 80 y.o. female, who is seen today for evaluation of right upper quadrant pain, rectal bleeding, diabetes and concern about home testing. One time yesterday and one time today experienced sharp but not at all severe pain in the right upper quadrant, and lasted no more than about 1 second. No other abdominal pain. Bowels are working well but she did use some milk of magnesia recently because she was somewhat constipated. She thinks it might have been peanuts that did that. She has been diagnosed with bleeding external hemorrhoids in the past, I have checked her myself and found hemorrhoids which were not actively bleeding at the time. She has a great appetite tries to follow a healthy diet, she uses metformin once a day and is not on other diabetic medicine. She has not had any definite urinary symptoms but did want her urine checked because of the abdominal pain. Balbina Gan  Current Outpatient Medications   Medication Sig Dispense Refill    meclizine (ANTIVERT) 25 mg tablet Take 1 Tab by mouth three (3) times daily as needed. 270 Tab 0    aspirin delayed-release 81 mg tablet Take 1 Tab by mouth daily. 100 Tab 11    clopidogrel (PLAVIX) 75 mg tab 1 po qday 30 Tab 11    simvastatin (ZOCOR) 80 mg tablet Take 1 Tab by mouth daily. 90 Tab 3    metFORMIN ER (GLUCOPHAGE XR) 500 mg tablet Take 1 Tab by mouth daily (with breakfast). 90 Tab 2    magnesium hydroxide (KATE MILK OF MAGNESIA) 400 mg/5 mL suspension Take 30 mL by mouth daily as needed for Constipation. Visit Vitals  /66 (BP 1 Location: Left arm, BP Patient Position: Sitting)   Pulse 80   Temp 98.3 °F (36.8 °C) (Oral)   Resp 12   Ht 4' 11\" (1.499 m)   Wt 143 lb (64.9 kg)   SpO2 96%   BMI 28.88 kg/m²     There is no CVA tenderness. Abdomen is not distended and not tympanitic. No tenderness anywhere. Abdomen is very soft. No hepatosplenomegaly or masses. No bruits.   Rectal exam reveals obvious fairly extensive external hemorrhoids which are not tender and not bleeding. Digital rectal exam reveals no nodularity or masses, diminished sphincter tone, and there is no tenderness. Stool is Hemoccult negative. Results for orders placed or performed in visit on 03/15/19   AMB POC URINALYSIS DIP STICK AUTO W/O MICRO   Result Value Ref Range    Color (UA POC) Yellow     Clarity (UA POC) Clear     Glucose (UA POC) Negative Negative    Bilirubin (UA POC) 1+ Negative    Ketones (UA POC) Negative Negative    Specific gravity (UA POC) 1.015 1.001 - 1.035    Blood (UA POC) Negative Negative    pH (UA POC) 5.5 4.6 - 8.0    Protein (UA POC) Negative Negative    Urobilinogen (UA POC) 0.2 mg/dL 0.2 - 1    Nitrites (UA POC) Negative Negative    Leukocyte esterase (UA POC) 1+ Negative     Hemoglobin A1c from January was 6.8. Assessment: #1.  2 sharp pains in the abdomen consistent with spasm of the intestine, not consistent with significant gastrointestinal disease including gallbladder, cancer, etc.  #2.  Occasional bright red blood per rectum with obvious external hemorrhoids and nothing else and no current bleeding. If this worsens I will have her see gastroenterology. #3.  Diabetes very well controlled, she will continue Metformin 500 mg with breakfast and diabetic diet but she does not need to be checking glucose at home as I have told her several times in the past and explained to her again today. With use of metformin or glucose will not go excessively low and from recent lab results unlikely to go terribly high. We can make do with hemoglobin A1c every 4 months as I told her again today. Follow-up as previously planned    Bridget Adorno. Tina Clark MD FACP    Please note: This document has been produced using voice recognition software. Unrecognized errors in transcription may be present. This visit lasted 10 minutes, greater than 50% of the time spent counseling as detailed above.

## 2019-04-10 ENCOUNTER — TELEPHONE (OUTPATIENT)
Dept: INTERNAL MEDICINE CLINIC | Age: 84
End: 2019-04-10

## 2019-04-10 NOTE — TELEPHONE ENCOUNTER
PT called Re:  Still having complications- not feeling better- still dizzy- she wants to speak with you

## 2019-04-12 NOTE — TELEPHONE ENCOUNTER
Patient has called again in regards to the dizziness she's been having. Stating Dr. Dwayne Holt thinks she may need to see a cardiologist. She is asking for you to refer to one.

## 2019-04-15 NOTE — TELEPHONE ENCOUNTER
Chief Complaint   Patient presents with   Wing Lewis MD  [facility] 39 minutes ago (10:32 AM)      The notes from Dr. Adrián Jimenez office indicate dizziness is related to previous stroke, there is nothing in these notes to indicate he wants her seen by a cardiologist; I do not know of anything a cardiologist could do for her.      04-15-19 Patient reached and 2 identifiers were used: Full Name, and Date of Birth verified. The patient was informed that Dr Gilma Charles did ont see anything by Dr Adrián Jimenez regarding the patient to see a Cardiologist for dizziness. The patient understands that Dr Adrián Jimenez felt the dizziness could be from her previous stroke.

## 2019-04-24 ENCOUNTER — TELEPHONE (OUTPATIENT)
Dept: INTERNAL MEDICINE CLINIC | Age: 84
End: 2019-04-24

## 2019-04-24 NOTE — TELEPHONE ENCOUNTER
Patient's son's Sahil Gleason, is calling to speak to a nurse. He wants to know if his Mother ever received and MRI or CT scan for the vertigo she's been having.

## 2019-04-24 NOTE — TELEPHONE ENCOUNTER
Pt sees neurology  Reviewed notes from neuro and our office   Per neuro, its not uncommon to stay dizzy after having the type of stroke she had. She also had MRI of the head 9/2018 and carotid duplex in 9/2018, showed less than 50% stenosis. Pt son says her dizziness is staying the same, no change really. No other concerning sx, she is just very dizzy all day and pretty miserable. I told her son that we can let neurology know but unsure what can be done unfortunately. He said he wanted to be sure she didn't need a repeat MRI to be sure nothing else was causing her sx.

## 2019-05-15 ENCOUNTER — APPOINTMENT (OUTPATIENT)
Dept: INTERNAL MEDICINE CLINIC | Age: 84
End: 2019-05-15

## 2019-05-15 ENCOUNTER — HOSPITAL ENCOUNTER (OUTPATIENT)
Dept: LAB | Age: 84
Discharge: HOME | End: 2019-05-15
Payer: MEDICARE

## 2019-05-15 DIAGNOSIS — E11.9 TYPE 2 DIABETES MELLITUS WITHOUT COMPLICATION, WITHOUT LONG-TERM CURRENT USE OF INSULIN (HCC): ICD-10-CM

## 2019-05-15 LAB
ALBUMIN SERPL-MCNC: 3.9 G/DL (ref 3.4–5)
ALBUMIN/GLOB SERPL: 1.3 {RATIO} (ref 0.8–1.7)
ALP SERPL-CCNC: 80 U/L (ref 45–117)
ALT SERPL-CCNC: 21 U/L (ref 13–56)
ANION GAP SERPL CALC-SCNC: 8 MMOL/L (ref 3–18)
AST SERPL-CCNC: 19 U/L (ref 15–37)
BILIRUB SERPL-MCNC: 0.3 MG/DL (ref 0.2–1)
BUN SERPL-MCNC: 13 MG/DL (ref 7–18)
BUN/CREAT SERPL: 14 (ref 12–20)
CALCIUM SERPL-MCNC: 9.1 MG/DL (ref 8.5–10.1)
CHLORIDE SERPL-SCNC: 108 MMOL/L (ref 100–108)
CHOLEST SERPL-MCNC: 124 MG/DL
CO2 SERPL-SCNC: 24 MMOL/L (ref 21–32)
CREAT SERPL-MCNC: 0.94 MG/DL (ref 0.6–1.3)
EST. AVERAGE GLUCOSE BLD GHB EST-MCNC: 148 MG/DL
GLOBULIN SER CALC-MCNC: 3.1 G/DL (ref 2–4)
GLUCOSE SERPL-MCNC: 155 MG/DL (ref 74–99)
HBA1C MFR BLD: 6.8 % (ref 4.2–5.6)
HDLC SERPL-MCNC: 50 MG/DL (ref 40–60)
HDLC SERPL: 2.5 {RATIO} (ref 0–5)
LDLC SERPL CALC-MCNC: 47.8 MG/DL (ref 0–100)
LIPID PROFILE,FLP: NORMAL
POTASSIUM SERPL-SCNC: 3.9 MMOL/L (ref 3.5–5.5)
PROT SERPL-MCNC: 7 G/DL (ref 6.4–8.2)
SODIUM SERPL-SCNC: 140 MMOL/L (ref 136–145)
TRIGL SERPL-MCNC: 131 MG/DL (ref ?–150)
VLDLC SERPL CALC-MCNC: 26.2 MG/DL

## 2019-05-15 PROCEDURE — 80061 LIPID PANEL: CPT

## 2019-05-15 PROCEDURE — 83036 HEMOGLOBIN GLYCOSYLATED A1C: CPT

## 2019-05-15 PROCEDURE — 80053 COMPREHEN METABOLIC PANEL: CPT

## 2019-05-15 PROCEDURE — 36415 COLL VENOUS BLD VENIPUNCTURE: CPT

## 2019-05-23 ENCOUNTER — OFFICE VISIT (OUTPATIENT)
Dept: INTERNAL MEDICINE CLINIC | Age: 84
End: 2019-05-23

## 2019-05-23 VITALS
SYSTOLIC BLOOD PRESSURE: 132 MMHG | HEIGHT: 59 IN | WEIGHT: 146.2 LBS | OXYGEN SATURATION: 96 % | DIASTOLIC BLOOD PRESSURE: 68 MMHG | TEMPERATURE: 98 F | BODY MASS INDEX: 29.48 KG/M2 | HEART RATE: 74 BPM | RESPIRATION RATE: 12 BRPM

## 2019-05-23 DIAGNOSIS — I10 ESSENTIAL HYPERTENSION WITH GOAL BLOOD PRESSURE LESS THAN 140/90: ICD-10-CM

## 2019-05-23 DIAGNOSIS — E11.51 TYPE 2 DIABETES MELLITUS WITH PERIPHERAL VASCULAR DISEASE (HCC): ICD-10-CM

## 2019-05-23 DIAGNOSIS — E78.5 HYPERLIPIDEMIA LDL GOAL <100: ICD-10-CM

## 2019-05-23 DIAGNOSIS — I63.30 CEREBROVASCULAR ACCIDENT (CVA) DUE TO THROMBOSIS OF CEREBRAL ARTERY (HCC): Primary | ICD-10-CM

## 2019-05-23 RX ORDER — METFORMIN HYDROCHLORIDE 500 MG/1
500 TABLET, EXTENDED RELEASE ORAL
Qty: 90 TAB | Refills: 3 | Status: SHIPPED | OUTPATIENT
Start: 2019-05-23 | End: 2020-08-05

## 2019-05-23 NOTE — PROGRESS NOTES
Judy Puga 5/17/1926, is a 80 y.o. female, who is seen today for reevaluation of diabetes with peripheral vascular disease, hypertension, hyperlipidemia overweight and chronic vertigo. She tells me again today that she has dizziness particular if she bends forward to tie her shoes but has not had any falls. She uses meclizine when the dizziness is particularly bad. She takes her medicine correctly and follows a very healthy diet. She asked me again today for a glucose monitor so she can keep track of her glucose at home. 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     Essential hypertension, benign     Stable     Hypercholesterolemia     Hypertension     Other and unspecified hyperlipidemia     Per PMD/ on Zocor    Type II or unspecified type diabetes mellitus without mention of complication, not stated as uncontrolled      Current Outpatient Medications   Medication Sig Dispense Refill    metFORMIN ER (GLUCOPHAGE XR) 500 mg tablet Take 1 Tab by mouth daily (with breakfast). 90 Tab 3    meclizine (ANTIVERT) 25 mg tablet Take 1 Tab by mouth three (3) times daily as needed. 270 Tab 0    aspirin delayed-release 81 mg tablet Take 1 Tab by mouth daily. 100 Tab 11    clopidogrel (PLAVIX) 75 mg tab 1 po qday 30 Tab 11    simvastatin (ZOCOR) 80 mg tablet Take 1 Tab by mouth daily. 90 Tab 3    magnesium hydroxide (KATE MILK OF MAGNESIA) 400 mg/5 mL suspension Take 30 mL by mouth daily as needed for Constipation. Visit Vitals  /68 (BP 1 Location: Left arm, BP Patient Position: Sitting)   Pulse 74   Temp 98 °F (36.7 °C) (Oral)   Resp 12   Ht 4' 11\" (1.499 m)   Wt 146 lb 3.2 oz (66.3 kg)   SpO2 96%   BMI 29.53 kg/m²     Carotids are 2+ without bruits. Lungs are clear to percussion. Good breath sounds with no wheezing or crackles. Heart reveals a regular rhythm with normal S1 and S2 no murmur gallop click or rub.   Apical impulse is not palpable. Abdomen is soft and nontender with no hepatosplenic megaly or masses and no bruits. Extremities reveal no clubbing cyanosis or edema. Pulses are 2+ except absent in the feet. Feet are warm without deformities. Results for orders placed or performed during the hospital encounter of 05/15/19   LIPID PANEL   Result Value Ref Range    LIPID PROFILE          Cholesterol, total 124 <200 MG/DL    Triglyceride 131 <150 MG/DL    HDL Cholesterol 50 40 - 60 MG/DL    LDL, calculated 47.8 0 - 100 MG/DL    VLDL, calculated 26.2 MG/DL    CHOL/HDL Ratio 2.5 0 - 5.0     HEMOGLOBIN A1C WITH EAG   Result Value Ref Range    Hemoglobin A1c 6.8 (H) 4.2 - 5.6 %    Est. average glucose 688 mg/dL   METABOLIC PANEL, COMPREHENSIVE   Result Value Ref Range    Sodium 140 136 - 145 mmol/L    Potassium 3.9 3.5 - 5.5 mmol/L    Chloride 108 100 - 108 mmol/L    CO2 24 21 - 32 mmol/L    Anion gap 8 3.0 - 18 mmol/L    Glucose 155 (H) 74 - 99 mg/dL    BUN 13 7.0 - 18 MG/DL    Creatinine 0.94 0.6 - 1.3 MG/DL    BUN/Creatinine ratio 14 12 - 20      GFR est AA >60 >60 ml/min/1.73m2    GFR est non-AA 56 (L) >60 ml/min/1.73m2    Calcium 9.1 8.5 - 10.1 MG/DL    Bilirubin, total 0.3 0.2 - 1.0 MG/DL    ALT (SGPT) 21 13 - 56 U/L    AST (SGOT) 19 15 - 37 U/L    Alk. phosphatase 80 45 - 117 U/L    Protein, total 7.0 6.4 - 8.2 g/dL    Albumin 3.9 3.4 - 5.0 g/dL    Globulin 3.1 2.0 - 4.0 g/dL    A-G Ratio 1.3 0.8 - 1.7       Assessment: #1.  Diabetes with peripheral vascular disease, no symptoms of intermittent claudication and well-controlled glucose. She does not need to be checking glucose at home as I told her again today. #2. Hyperlipidemia doing well, she will continue simvastatin 80 mg each evening. #3.  Chronic benign positional vertigo, I told her again today there is no cure for this and physical therapy rarely helps. She will use meclizine as needed. #4.   She is mildly overweight, of asked her not to gain further weight and if she could lose just a few pounds over the next 4 months that would be great. Follow-up 4 months for complete evaluation. We will do that in a 15-minute slot. Patrick Benjamin MD FACP    Please note: This document has been produced using voice recognition software. Unrecognized errors in transcription may be present.

## 2019-05-28 RX ORDER — MECLIZINE HYDROCHLORIDE 25 MG/1
25 TABLET ORAL
Qty: 270 TAB | Refills: 0 | Status: SHIPPED | OUTPATIENT
Start: 2019-05-28 | End: 2019-09-21 | Stop reason: SDUPTHER

## 2019-05-28 NOTE — TELEPHONE ENCOUNTER
Last Visit: 5/23/19 with MD Sara Mai  Next Appointment: 9/25/19 with MD Sara Mai  Previous Refill Encounter(s): 2/15/19 #270    Requested Prescriptions     Pending Prescriptions Disp Refills    meclizine (ANTIVERT) 25 mg tablet 270 Tab 0     Sig: Take 1 Tab by mouth three (3) times daily as needed.

## 2019-07-19 ENCOUNTER — TELEPHONE (OUTPATIENT)
Dept: INTERNAL MEDICINE CLINIC | Age: 84
End: 2019-07-19

## 2019-07-19 NOTE — TELEPHONE ENCOUNTER
Spoke with patient, she denies any lightheadedness, dizziness, or feeling weak. She states that her mouth feels dry. Patient instructed to drink plenty of water and sugar free powerade for electrolytes. Patient advised to call the office or go to the ER if she develops any of the symptoms listed above.

## 2019-07-19 NOTE — TELEPHONE ENCOUNTER
Pt asking for a nurse to call her back ,she said for the past few days she has been dehydrated, and is asking for some direction on what she should do. I asked what symptoms she is having ,she didn't mention any other than she is dehydrated and she is diabetic and would like to speak to the nurse.  Please advise

## 2019-08-12 ENCOUNTER — HOSPITAL ENCOUNTER (OUTPATIENT)
Dept: LAB | Age: 84
Discharge: HOME | End: 2019-08-12
Payer: MEDICARE

## 2019-08-12 ENCOUNTER — OFFICE VISIT (OUTPATIENT)
Dept: INTERNAL MEDICINE CLINIC | Age: 84
End: 2019-08-12

## 2019-08-12 VITALS
HEART RATE: 84 BPM | SYSTOLIC BLOOD PRESSURE: 110 MMHG | DIASTOLIC BLOOD PRESSURE: 70 MMHG | HEIGHT: 59 IN | WEIGHT: 140.8 LBS | OXYGEN SATURATION: 96 % | BODY MASS INDEX: 28.39 KG/M2 | RESPIRATION RATE: 14 BRPM | TEMPERATURE: 97.1 F

## 2019-08-12 DIAGNOSIS — E11.9 TYPE 2 DIABETES MELLITUS WITHOUT COMPLICATION, WITHOUT LONG-TERM CURRENT USE OF INSULIN (HCC): Primary | ICD-10-CM

## 2019-08-12 DIAGNOSIS — E11.9 TYPE 2 DIABETES MELLITUS WITHOUT COMPLICATION, WITHOUT LONG-TERM CURRENT USE OF INSULIN (HCC): ICD-10-CM

## 2019-08-12 DIAGNOSIS — F41.9 CHRONIC ANXIETY: ICD-10-CM

## 2019-08-12 DIAGNOSIS — H81.10 BENIGN PAROXYSMAL POSITIONAL VERTIGO, UNSPECIFIED LATERALITY: ICD-10-CM

## 2019-08-12 LAB
ALBUMIN SERPL-MCNC: 4.2 G/DL (ref 3.4–5)
ALBUMIN/GLOB SERPL: 1.2 {RATIO} (ref 0.8–1.7)
ALP SERPL-CCNC: 85 U/L (ref 45–117)
ALT SERPL-CCNC: 19 U/L (ref 13–56)
ANION GAP SERPL CALC-SCNC: 5 MMOL/L (ref 3–18)
AST SERPL-CCNC: 17 U/L (ref 10–38)
BILIRUB SERPL-MCNC: 0.2 MG/DL (ref 0.2–1)
BUN SERPL-MCNC: 15 MG/DL (ref 7–18)
BUN/CREAT SERPL: 14 (ref 12–20)
CALCIUM SERPL-MCNC: 9.3 MG/DL (ref 8.5–10.1)
CHLORIDE SERPL-SCNC: 109 MMOL/L (ref 100–111)
CO2 SERPL-SCNC: 27 MMOL/L (ref 21–32)
CREAT SERPL-MCNC: 1.06 MG/DL (ref 0.6–1.3)
EST. AVERAGE GLUCOSE BLD GHB EST-MCNC: 140 MG/DL
GLOBULIN SER CALC-MCNC: 3.4 G/DL (ref 2–4)
GLUCOSE SERPL-MCNC: 132 MG/DL (ref 74–99)
HBA1C MFR BLD: 6.5 % (ref 4.2–5.6)
POTASSIUM SERPL-SCNC: 4.5 MMOL/L (ref 3.5–5.5)
PROT SERPL-MCNC: 7.6 G/DL (ref 6.4–8.2)
SODIUM SERPL-SCNC: 141 MMOL/L (ref 136–145)

## 2019-08-12 PROCEDURE — 83036 HEMOGLOBIN GLYCOSYLATED A1C: CPT

## 2019-08-12 PROCEDURE — 80053 COMPREHEN METABOLIC PANEL: CPT

## 2019-08-12 NOTE — PROGRESS NOTES
Ochoa Bernardo presents today for   Chief Complaint   Patient presents with    Dizziness     reports episodes of feeling lightheaded and dehydrated x 1 month, also complaining of dry mouth              Depression Screening:  3 most recent PHQ Screens 5/23/2019   Little interest or pleasure in doing things Not at all   Feeling down, depressed, irritable, or hopeless Not at all   Total Score PHQ 2 0       Learning Assessment:  Learning Assessment 2/11/2015   PRIMARY LEARNER Patient   HIGHEST LEVEL OF EDUCATION - PRIMARY LEARNER  -   BARRIERS PRIMARY LEARNER NONE   PRIMARY LANGUAGE ENGLISH   LEARNER PREFERENCE PRIMARY LISTENING   ANSWERED BY patient   RELATIONSHIP SELF       Abuse Screening:  Abuse Screening Questionnaire 8/14/2018   Do you ever feel afraid of your partner? N   Are you in a relationship with someone who physically or mentally threatens you? N   Is it safe for you to go home? Y       Fall Risk  Fall Risk Assessment, last 12 mths 5/23/2019   Able to walk? Yes   Fall in past 12 months? No   Fall with injury? -   Number of falls in past 12 months -   Fall Risk Score -           Coordination of Care:  1. Have you been to the ER, urgent care clinic since your last visit? Hospitalized since your last visit? NO    2. Have you seen or consulted any other health care providers outside of the [address] [place] [address] since your last visit? Include any pap smears or colon screening.  NO

## 2019-08-12 NOTE — PROGRESS NOTES
Aimee Christensen 5/17/1926, is a 80 y.o. female, who is seen today for dizziness and possible dehydration. She has been drinking a lot of fluids including water and drinks with no calories but is felt dehydrated. She is urinating plenty often during the day. She feels a little better from the dehydration standpoint than she did yesterday. For about a month she has had intermittent dizziness like she has had before. She uses meclizine and that does help her. She wants to know about checking her glucose at home, she is asked me this for over 5 times this year. Each time I told her she does not need to be checking it unless she is on insulin. 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     Essential hypertension, benign     Stable     Hypercholesterolemia     Hypertension     Other and unspecified hyperlipidemia     Per PMD/ on Zocor    Type II or unspecified type diabetes mellitus without mention of complication, not stated as uncontrolled      Current Outpatient Medications   Medication Sig Dispense Refill    meclizine (ANTIVERT) 25 mg tablet Take 1 Tab by mouth three (3) times daily as needed for Dizziness. 270 Tab 0    metFORMIN ER (GLUCOPHAGE XR) 500 mg tablet Take 1 Tab by mouth daily (with breakfast). 90 Tab 3    aspirin delayed-release 81 mg tablet Take 1 Tab by mouth daily. 100 Tab 11    clopidogrel (PLAVIX) 75 mg tab 1 po qday 30 Tab 11    simvastatin (ZOCOR) 80 mg tablet Take 1 Tab by mouth daily. 90 Tab 3    magnesium hydroxide (KATE MILK OF MAGNESIA) 400 mg/5 mL suspension Take 30 mL by mouth daily as needed for Constipation. Visit Vitals  /70 (BP 1 Location: Left arm, BP Patient Position: Sitting)   Pulse 84   Temp 97.1 °F (36.2 °C) (Oral)   Resp 14   Ht 4' 11\" (1.499 m)   Wt 140 lb 12.8 oz (63.9 kg)   SpO2 96%   BMI 28.44 kg/m²     Carotids are 2+ without bruits. [place] is absent.   Lungs are clear to percussion. Good breath sounds with no wheezing or crackles. Heart reveals a regular rhythm with normal S1 and S2 no murmur gallop click or rub. Apical impulse is not palpable. Abdomen is soft and nontender with no hepatospleno megaly or masses and no bruits. Extremities reveal no clubbing cyanosis or edema. #4. She obviously has a fair amount of anxiety, I tried to calm her down and let her know everything seems to be good but we will check lab for her and notify her of those results when available tomorrow. Assessment: #1.  Diabetes has been doing well, she will continue current diet and regimen we will check hemoglobin A1c for her today. I told her again today she does not need to be breaking her finger and checking glucose at home since that will not change therapy. #2.  Recurrent dizziness last several years at least, ever since I been seeing her. She will use meclizine as needed. #3. She thinks she is dehydrated which seems unlikely from exam and symptoms. We will check CMP today. Follow-up as previously planned    Christophe Callahan. Abel Bear MD FACP    Please note: This document has been produced using voice recognition software. Unrecognized errors in transcription may be present.

## 2019-08-13 ENCOUNTER — TELEPHONE (OUTPATIENT)
Dept: INTERNAL MEDICINE CLINIC | Age: 84
End: 2019-08-13

## 2019-08-13 NOTE — PROGRESS NOTES
Please notify the patient that all of her chemistries look very good and her hemoglobin A1c is 6.5. Her glucose remains very well controlled and she does not need to be checking glucose levels at home.

## 2019-08-13 NOTE — TELEPHONE ENCOUNTER
----- Message from Bandar May MD sent at 8/13/2019  7:16 AM EDT -----  Please notify the patient that all of her chemistries look very good and her hemoglobin A1c is 6.5. Her glucose remains very well controlled and she does not need to be checking glucose levels at home.

## 2019-08-16 ENCOUNTER — OFFICE VISIT (OUTPATIENT)
Dept: NEUROLOGY | Age: 84
End: 2019-08-16

## 2019-08-16 VITALS
RESPIRATION RATE: 20 BRPM | DIASTOLIC BLOOD PRESSURE: 68 MMHG | OXYGEN SATURATION: 97 % | BODY MASS INDEX: 28.47 KG/M2 | HEIGHT: 59 IN | HEART RATE: 74 BPM | SYSTOLIC BLOOD PRESSURE: 120 MMHG | TEMPERATURE: 98.1 F | WEIGHT: 141.2 LBS

## 2019-08-16 DIAGNOSIS — I63.30 CEREBROVASCULAR ACCIDENT (CVA) DUE TO THROMBOSIS OF CEREBRAL ARTERY (HCC): Primary | ICD-10-CM

## 2019-08-16 DIAGNOSIS — R42 LIGHTHEADED: ICD-10-CM

## 2019-08-16 DIAGNOSIS — R42 DIZZY: ICD-10-CM

## 2019-08-16 NOTE — PROGRESS NOTES
Flo Kellogg is a 80 y.o. female in today to discuss dizziness and h/o CVA. Learning assessment previously completed 2/11/2015; primary language is [place]. 1. Have you been to the ER, urgent care clinic since your last visit? Hospitalized since your last visit? No    2. Have you seen or consulted any other health care providers outside of the [address] [place] [address] since your last visit? Include any pap smears or colon screening.  No    Vitals:    08/16/19 0910 08/16/19 0931 08/16/19 0932 08/16/19 0934   BP: 138/70 140/70 128/70 120/68   BP 1 Location: Left arm Left arm Left arm Left arm   BP Patient Position: Sitting Supine Sitting Standing   Pulse: 74 67 68 74   Resp: 20      Temp: 98.1 °F (36.7 °C)      TempSrc: Oral      SpO2: 97%      Weight: 64 kg (141 lb 3.2 oz)      Height: 4' 11\" (1.499 m)

## 2019-08-16 NOTE — LETTER
8/16/19 Patient: America Lim YOB: 1926 Date of Visit: 8/16/2019 Scooter Petty MD 
[address] [place] [address] VIA In Basket Dear Scooter Petty MD, Thank you for referring Ms. Abdifatah Alcala to [facility] for evaluation. My notes for this consultation are attached. If you have questions, please do not hesitate to call me. I look forward to following your patient along with you.  
 
 
Sincerely, 
 
David Jones MD

## 2019-08-16 NOTE — PROGRESS NOTES
Re:  Kaylin Hopkins up visit     8/16/2019 9:39 AM    SSN: [id number]    Subjective:   Salvatore Merrill returns today for follow up. She continues to complain of dizziness in the head. She feels a combination of lightheaded and some vertigo. This seems to get worse with position changes. Medications:    Current Outpatient Medications   Medication Sig Dispense Refill    meclizine (ANTIVERT) 25 mg tablet Take 1 Tab by mouth three (3) times daily as needed for Dizziness. 270 Tab 0    metFORMIN ER (GLUCOPHAGE XR) 500 mg tablet Take 1 Tab by mouth daily (with breakfast). 90 Tab 3    aspirin delayed-release 81 mg tablet Take 1 Tab by mouth daily. 100 Tab 11    clopidogrel (PLAVIX) 75 mg tab 1 po qday 30 Tab 11    simvastatin (ZOCOR) 80 mg tablet Take 1 Tab by mouth daily. 90 Tab 3    magnesium hydroxide (KATE MILK OF MAGNESIA) 400 mg/5 mL suspension Take 30 mL by mouth daily as needed for Constipation.          Vital signs:    Visit Vitals  /68 (BP 1 Location: Left arm, BP Patient Position: Standing)   Pulse 74   Temp 98.1 °F (36.7 °C) (Oral)   Resp 20   Ht 4' 11\" (1.499 m)   Wt 64 kg (141 lb 3.2 oz)   SpO2 97%   BMI 28.52 kg/m²     Supine /70 P 67  Standing /68 P 74    Review of Systems:   As above otherwise 11 point review of systems negative including;   Constitutional no fever or chills  Skin denies rash or itching  HEENT  Denies tinnitus, hearing lose  Eyes denies diplopia vision lose  Respiratory denies sortness of breath  Cardiovascular denies chest pain, dyspnea on exertion  Gastrointestinal denies nausea, vomiting, diarrhea, constipation  Genitourinary denies incontinence  Musculoskeletal denies joint pain or swelling  Endocrine denies weight change  Hematology denies easy bruising or bleeding   Neurological as above in HPI      Patient Active Problem List   Diagnosis Code    Aortic valve disorders I35.9    Vasovagal syncope R55    Chronic constipation K59.09    Hyperlipidemia LDL goal <100 E78.5    Essential hypertension with goal blood pressure less than 140/90 I10    Type 2 diabetes mellitus without complication, without long-term current use of insulin ([facility]) E11.9    Weakness generalized R53.1    Primary osteoarthritis involving multiple joints M15.0    Benign paroxysmal vertigo H81.10    Hypovitaminosis D E55.9    Antiplatelet or antithrombotic long-term use Z79.02    On statin therapy Z79.899    Cerebrovascular accident (CVA) due to thrombosis of cerebral artery ([facility]) I63.30    Dyslipidemia, goal LDL below 70 E78.5    Chronic anxiety F41.9    Bleeding external hemorrhoids K64.4    Chronic vertigo R42    Type 2 diabetes mellitus with peripheral vascular disease ([facility]) E11.51         Objective: The patient is awake, alert, and oriented x 4. Fund of knowledge is adequate. Speech is fluent and memory is intact. Cranial Nerves: II - Visual fields are full to confrontation. III, IV, VI - Extraocular movements are intact. There is no nystagmus. V - Facial sensation is intact to pinprick. VII - Face is symmetrical.  VIII - Hearing is present. IX, X, XII - Palate is symmetrical.   XI - Shoulder shrugging and head turning intact  Motor: The patient moves all four limbs fairly well and symmetrically. Tone is normal. Reflexes are 2+ and symmetrical. Plantars are down going. Gait is normal.  Noted is she just starts waling, doesn't stand and walk.       Final result (Exam End: 9/10/2018 07:29) Provider Status: Reviewed   Study Result     Brain MR Without Contrast     HISTORY: Dizzy, lightheaded, loss of consciousness.     COMPARISON: CT head 9/10/2010.     TECHNIQUE: Brain scanned with sagittal T1W scans, axial T2W scans, axial FLAIR,  and axial diffusion weighted images.      FINDINGS:      Subtle increase diffusion weighted signal in the ventral lower nayeli/medulla  oblongata without low signal on the ADC map and with subtle increased T2  signal/less pronounced subtle increased FLAIR signal. Otherwise no evidence of  restricted diffusion to indicate acute ischemia. No intracranial hemorrhage. Mild generalized volume loss, age appropriate. Midline structures are  unremarkable. Mild periventricular and bifrontal subcortical increased T2/FLAIR  hyperintensity, nonspecific and likely due to chronic small vessel changes. Otherwise the brain brain parenchyma is normal in signal and morphology. No  abnormal intra or extra axial fluid collections are noted.     The paranasal sinuses and mastoid air cells are aerated. Flow voids present  major vessels at the base of the brain and major venous sinuses. Calvarium and  upper cervical spine unremarkable.     IMPRESSION  IMPRESSION:     1. Questionable remote subacute ischemic infarct in the lower nayeli/medulla  oblongata, if true infarct the symptomatology would be quite obvious. Otherwise  no evidence of ischemic infarct, mass or hemorrhage. 2. Mild burden of periventricular white matter presumed chronic small vessel  disease.               I have reviewed the above imagines myself.     CBC:   Lab Results   Component Value Date/Time    WBC 5.9 08/14/2018 11:27 AM    RBC 5.36 (H) 08/14/2018 11:27 AM    HGB 14.7 08/14/2018 11:27 AM    HCT 44.4 08/14/2018 11:27 AM    PLATELET 853 29/88/1833 11:27 AM     BMP:   Lab Results   Component Value Date/Time    Glucose 132 (H) 08/12/2019 03:25 PM    Sodium 141 08/12/2019 03:25 PM    Potassium 4.5 08/12/2019 03:25 PM    Chloride 109 08/12/2019 03:25 PM    CO2 27 08/12/2019 03:25 PM    BUN 15 08/12/2019 03:25 PM    Creatinine 1.06 08/12/2019 03:25 PM    Calcium 9.3 08/12/2019 03:25 PM     CMP:   Lab Results   Component Value Date/Time    Glucose 132 (H) 08/12/2019 03:25 PM    Sodium 141 08/12/2019 03:25 PM    Potassium 4.5 08/12/2019 03:25 PM    Chloride 109 08/12/2019 03:25 PM    CO2 27 08/12/2019 03:25 PM    BUN 15 08/12/2019 03:25 PM    Creatinine 1.06 08/12/2019 03:25 PM    Calcium 9.3 08/12/2019 03:25 PM    Anion gap 5 08/12/2019 03:25 PM    BUN/Creatinine ratio 14 08/12/2019 03:25 PM    Alk. 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 She also has some orthostatic hypotension, and considering her advanced age, I suspect her complaints are related to the orthostatics. Plan:  From my standpoint nothing to do that's not already being done. Continue current stroke prevention measures. Encourage to stay hydrated. Will see her back in an as needed basis only. Sincerely,        Grace Roberto.  David Manzano M.D.

## 2019-09-13 ENCOUNTER — HOSPITAL ENCOUNTER (OUTPATIENT)
Dept: LAB | Age: 84
Discharge: HOME | End: 2019-09-13
Payer: MEDICARE

## 2019-09-13 ENCOUNTER — APPOINTMENT (OUTPATIENT)
Dept: INTERNAL MEDICINE CLINIC | Age: 84
End: 2019-09-13

## 2019-09-13 DIAGNOSIS — I10 ESSENTIAL HYPERTENSION WITH GOAL BLOOD PRESSURE LESS THAN 140/90: ICD-10-CM

## 2019-09-13 DIAGNOSIS — E78.5 HYPERLIPIDEMIA LDL GOAL <100: ICD-10-CM

## 2019-09-13 DIAGNOSIS — E11.51 TYPE 2 DIABETES MELLITUS WITH PERIPHERAL VASCULAR DISEASE (HCC): ICD-10-CM

## 2019-09-13 LAB
ALBUMIN SERPL-MCNC: 3.9 G/DL (ref 3.4–5)
ALBUMIN/GLOB SERPL: 1.1 {RATIO} (ref 0.8–1.7)
ALP SERPL-CCNC: 84 U/L (ref 45–117)
ALT SERPL-CCNC: 17 U/L (ref 13–56)
ANION GAP SERPL CALC-SCNC: 7 MMOL/L (ref 3–18)
APPEARANCE UR: CLEAR
AST SERPL-CCNC: 20 U/L (ref 10–38)
BACTERIA URNS QL MICRO: NEGATIVE /HPF
BASOPHILS # BLD: 0 K/UL (ref 0–0.1)
BASOPHILS NFR BLD: 1 % (ref 0–2)
BILIRUB SERPL-MCNC: 0.5 MG/DL (ref 0.2–1)
BILIRUB UR QL: NEGATIVE
BUN SERPL-MCNC: 9 MG/DL (ref 7–18)
BUN/CREAT SERPL: 9 (ref 12–20)
CALCIUM SERPL-MCNC: 9.1 MG/DL (ref 8.5–10.1)
CHLORIDE SERPL-SCNC: 111 MMOL/L (ref 100–111)
CO2 SERPL-SCNC: 25 MMOL/L (ref 21–32)
COLOR UR: YELLOW
CREAT SERPL-MCNC: 0.97 MG/DL (ref 0.6–1.3)
DIFFERENTIAL METHOD BLD: ABNORMAL
EOSINOPHIL # BLD: 0.3 K/UL (ref 0–0.4)
EOSINOPHIL NFR BLD: 6 % (ref 0–5)
EPITH CASTS URNS QL MICRO: NORMAL /LPF (ref 0–5)
ERYTHROCYTE [DISTWIDTH] IN BLOOD BY AUTOMATED COUNT: 13.5 % (ref 11.6–14.5)
EST. AVERAGE GLUCOSE BLD GHB EST-MCNC: 143 MG/DL
GLOBULIN SER CALC-MCNC: 3.5 G/DL (ref 2–4)
GLUCOSE SERPL-MCNC: 112 MG/DL (ref 74–99)
GLUCOSE UR STRIP.AUTO-MCNC: NEGATIVE MG/DL
HBA1C MFR BLD: 6.6 % (ref 4.2–5.6)
HCT VFR BLD AUTO: 43.6 % (ref 35–45)
HGB BLD-MCNC: 14 G/DL (ref 12–16)
HGB UR QL STRIP: NEGATIVE
KETONES UR QL STRIP.AUTO: NEGATIVE MG/DL
LEUKOCYTE ESTERASE UR QL STRIP.AUTO: ABNORMAL
LYMPHOCYTES # BLD: 2.1 K/UL (ref 0.9–3.6)
LYMPHOCYTES NFR BLD: 39 % (ref 21–52)
MCH RBC QN AUTO: 27.6 PG (ref 24–34)
MCHC RBC AUTO-ENTMCNC: 32.1 G/DL (ref 31–37)
MCV RBC AUTO: 85.8 FL (ref 74–97)
MONOCYTES # BLD: 0.7 K/UL (ref 0.05–1.2)
MONOCYTES NFR BLD: 13 % (ref 3–10)
NEUTS SEG # BLD: 2.1 K/UL (ref 1.8–8)
NEUTS SEG NFR BLD: 41 % (ref 40–73)
NITRITE UR QL STRIP.AUTO: NEGATIVE
PH UR STRIP: 7.5 [PH] (ref 5–8)
PLATELET # BLD AUTO: 206 K/UL (ref 135–420)
PMV BLD AUTO: 11.1 FL (ref 9.2–11.8)
POTASSIUM SERPL-SCNC: 4.1 MMOL/L (ref 3.5–5.5)
PROT SERPL-MCNC: 7.4 G/DL (ref 6.4–8.2)
PROT UR STRIP-MCNC: NEGATIVE MG/DL
RBC # BLD AUTO: 5.08 M/UL (ref 4.2–5.3)
RBC #/AREA URNS HPF: NEGATIVE /HPF (ref 0–5)
SODIUM SERPL-SCNC: 143 MMOL/L (ref 136–145)
SP GR UR REFRACTOMETRY: 1.01 (ref 1–1.03)
TSH SERPL DL<=0.05 MIU/L-ACNC: 0.92 UIU/ML (ref 0.36–3.74)
UROBILINOGEN UR QL STRIP.AUTO: 0.2 EU/DL (ref 0.2–1)
WBC # BLD AUTO: 5.3 K/UL (ref 4.6–13.2)
WBC URNS QL MICRO: NORMAL /HPF (ref 0–4)

## 2019-09-13 PROCEDURE — 82043 UR ALBUMIN QUANTITATIVE: CPT

## 2019-09-13 PROCEDURE — 80061 LIPID PANEL: CPT

## 2019-09-13 PROCEDURE — 85025 COMPLETE CBC W/AUTO DIFF WBC: CPT

## 2019-09-13 PROCEDURE — 84439 ASSAY OF FREE THYROXINE: CPT

## 2019-09-13 PROCEDURE — 36415 COLL VENOUS BLD VENIPUNCTURE: CPT

## 2019-09-13 PROCEDURE — 83036 HEMOGLOBIN GLYCOSYLATED A1C: CPT

## 2019-09-13 PROCEDURE — 81001 URINALYSIS AUTO W/SCOPE: CPT

## 2019-09-13 PROCEDURE — 80053 COMPREHEN METABOLIC PANEL: CPT

## 2019-09-13 PROCEDURE — 84443 ASSAY THYROID STIM HORMONE: CPT

## 2019-09-14 LAB
CHOLEST SERPL-MCNC: 117 MG/DL
CREAT UR-MCNC: 68 MG/DL (ref 30–125)
HDLC SERPL-MCNC: 48 MG/DL (ref 40–60)
HDLC SERPL: 2.4 {RATIO} (ref 0–5)
LDLC SERPL CALC-MCNC: 52.4 MG/DL (ref 0–100)
LIPID PROFILE,FLP: NORMAL
MICROALBUMIN UR-MCNC: 0.98 MG/DL (ref 0–3)
MICROALBUMIN/CREAT UR-RTO: 14 MG/G (ref 0–30)
T4 FREE SERPL-MCNC: 0.9 NG/DL (ref 0.7–1.5)
TRIGL SERPL-MCNC: 83 MG/DL (ref ?–150)
VLDLC SERPL CALC-MCNC: 16.6 MG/DL

## 2019-09-23 RX ORDER — MECLIZINE HYDROCHLORIDE 25 MG/1
25 TABLET ORAL
Qty: 270 TAB | Refills: 0 | Status: SHIPPED | OUTPATIENT
Start: 2019-09-23 | End: 2019-11-19 | Stop reason: SDUPTHER

## 2019-09-25 ENCOUNTER — OFFICE VISIT (OUTPATIENT)
Dept: INTERNAL MEDICINE CLINIC | Age: 84
End: 2019-09-25

## 2019-09-25 VITALS
RESPIRATION RATE: 16 BRPM | HEART RATE: 66 BPM | BODY MASS INDEX: 28.63 KG/M2 | DIASTOLIC BLOOD PRESSURE: 72 MMHG | WEIGHT: 142 LBS | OXYGEN SATURATION: 98 % | SYSTOLIC BLOOD PRESSURE: 114 MMHG | TEMPERATURE: 98.5 F | HEIGHT: 59 IN

## 2019-09-25 DIAGNOSIS — E11.51 TYPE 2 DIABETES MELLITUS WITH PERIPHERAL VASCULAR DISEASE (HCC): Primary | ICD-10-CM

## 2019-09-25 DIAGNOSIS — K59.09 CHRONIC CONSTIPATION: ICD-10-CM

## 2019-09-25 DIAGNOSIS — Z23 ENCOUNTER FOR IMMUNIZATION: ICD-10-CM

## 2019-09-25 DIAGNOSIS — H81.10 BENIGN PAROXYSMAL POSITIONAL VERTIGO, UNSPECIFIED LATERALITY: ICD-10-CM

## 2019-09-25 DIAGNOSIS — E78.5 HYPERLIPIDEMIA LDL GOAL <100: ICD-10-CM

## 2019-09-25 NOTE — PROGRESS NOTES
sAif Elias 5/17/1926, is a 80 y.o. female, who is seen today for reevaluation of diabetes hyperlipidemia vertigo peripheral arterial disease. She is feeling generally well and has not been dizzy the last several days but frequently has dizziness, enough to require meclizine. She has had no falls in more than a year. She has peripheral arterial disease and is maintained on Plavix and aspirin but is not having intermittent claudication at this point. She takes her medicine regularly for diabetes and high cholesterol. 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     Essential hypertension, benign     Stable     Hypercholesterolemia     Hypertension     Other and unspecified hyperlipidemia     Per PMD/ on Zocor    Type II or unspecified type diabetes mellitus without mention of complication, not stated as uncontrolled      Past Surgical History:   Procedure Laterality Date    HX HEENT      bilateral cataract surgery    HX HEMORRHOIDECTOMY      HX ORTHOPAEDIC      bunionectomy    HX OTHER SURGICAL      Esophageal dilatation     Current Outpatient Medications   Medication Sig Dispense Refill    meclizine (ANTIVERT) 25 mg tablet TAKE 1 TAB BY MOUTH THREE (3) TIMES DAILY AS NEEDED FOR DIZZINESS. 270 Tab 0    metFORMIN ER (GLUCOPHAGE XR) 500 mg tablet Take 1 Tab by mouth daily (with breakfast). 90 Tab 3    aspirin delayed-release 81 mg tablet Take 1 Tab by mouth daily. 100 Tab 11    clopidogrel (PLAVIX) 75 mg tab 1 po qday 30 Tab 11    simvastatin (ZOCOR) 80 mg tablet Take 1 Tab by mouth daily. 90 Tab 3    magnesium hydroxide (KATE MILK OF MAGNESIA) 400 mg/5 mL suspension Take 30 mL by mouth daily as needed for Constipation.        No Known Allergies  Social History     Socioeconomic History    Marital status:      Spouse name: Not on file    Number of children: Not on file    Years of education: Not on file    Highest education level: Not on file   Tobacco Use    Smoking status: Never Smoker    Smokeless tobacco: Never Used   Substance and Sexual Activity    Alcohol use: No    Drug use: No    Sexual activity: Not Currently     Visit Vitals  /72 (BP 1 Location: Right arm, BP Patient Position: Sitting)   Pulse 66   Temp 98.5 °F (36.9 °C) (Oral)   Resp 16   Ht 4' 11\" (1.499 m)   Wt 142 lb (64.4 kg)   SpO2 98%   BMI 28.68 kg/m²     The patient is a well-developed well-nourished female in no apparent distress. HEENT: Pupils are equal and react to light and extraocular movements are full. Ear canals and tympanic membranes appear normal. Oral cavity appears normal with no oral lesions. Neck: Carotids are 2+ without bruits. No adenopathy or thyromegaly. Lungs are clear to percussion. I hear no wheezing, rales or rhonchi. Heart reveals a regular rhythm with no murmur, gallop, click or rub. The apical impulse is in the fifth interspace at the midclavicular line. Abdomen is soft and nontender with no hepatosplenomegaly or masses. Bowel sounds are normoactive and there is no distention or tympany. Extremities reveal no clubbing cyanosis or edema. Pulses are 2+ except absent dorsalis pedis and posterior tibial pulses on the right and absent left posterior tibial pulse. The feet reveal no deformities ulcerations or calluses. Normal sensation to monofilament testing. Skin reveals no suspicious skin growths. Breasts reveal no masses, skin or nipple abnormalities. No axillary adenopathy.     Results for orders placed or performed during the hospital encounter of 95/80/07   METABOLIC PANEL, COMPREHENSIVE   Result Value Ref Range    Sodium 143 136 - 145 mmol/L    Potassium 4.1 3.5 - 5.5 mmol/L    Chloride 111 100 - 111 mmol/L    CO2 25 21 - 32 mmol/L    Anion gap 7 3.0 - 18 mmol/L    Glucose 112 (H) 74 - 99 mg/dL    BUN 9 7.0 - 18 MG/DL    Creatinine 0.97 0.6 - 1.3 MG/DL    BUN/Creatinine ratio 9 (L) 12 - 20      GFR est AA >60 >60 ml/min/1.73m2    GFR est non-AA 54 (L) >60 ml/min/1.73m2    Calcium 9.1 8.5 - 10.1 MG/DL    Bilirubin, total 0.5 0.2 - 1.0 MG/DL    ALT (SGPT) 17 13 - 56 U/L    AST (SGOT) 20 10 - 38 U/L    Alk. phosphatase 84 45 - 117 U/L    Protein, total 7.4 6.4 - 8.2 g/dL    Albumin 3.9 3.4 - 5.0 g/dL    Globulin 3.5 2.0 - 4.0 g/dL    A-G Ratio 1.1 0.8 - 1.7     MICROALBUMIN, UR, RAND W/ MICROALB/CREAT RATIO   Result Value Ref Range    Microalbumin,urine random 0.98 0 - 3.0 MG/DL    Creatinine, urine 68.00 30 - 125 mg/dL    Microalbumin/Creat ratio (mg/g creat) 14 0 - 30 mg/g   URINALYSIS W/ RFLX MICROSCOPIC   Result Value Ref Range    Color YELLOW      Appearance CLEAR      Specific gravity 1.013 1.005 - 1.030      pH (UA) 7.5 5.0 - 8.0      Protein NEGATIVE  NEG mg/dL    Glucose NEGATIVE  NEG mg/dL    Ketone NEGATIVE  NEG mg/dL    Bilirubin NEGATIVE  NEG      Blood NEGATIVE  NEG      Urobilinogen 0.2 0.2 - 1.0 EU/dL    Nitrites NEGATIVE  NEG      Leukocyte Esterase TRACE (A) NEG     T4, FREE   Result Value Ref Range    T4, Free 0.9 0.7 - 1.5 NG/DL   TSH 3RD GENERATION   Result Value Ref Range    TSH 0.92 0.36 - 3.74 uIU/mL   CBC WITH AUTOMATED DIFF   Result Value Ref Range    WBC 5.3 4.6 - 13.2 K/uL    RBC 5.08 4.20 - 5.30 M/uL    HGB 14.0 12.0 - 16.0 g/dL    HCT 43.6 35.0 - 45.0 %    MCV 85.8 74.0 - 97.0 FL    MCH 27.6 24.0 - 34.0 PG    MCHC 32.1 31.0 - 37.0 g/dL    RDW 13.5 11.6 - 14.5 %    PLATELET 196 527 - 949 K/uL    MPV 11.1 9.2 - 11.8 FL    NEUTROPHILS 41 40 - 73 %    LYMPHOCYTES 39 21 - 52 %    MONOCYTES 13 (H) 3 - 10 %    EOSINOPHILS 6 (H) 0 - 5 %    BASOPHILS 1 0 - 2 %    ABS. NEUTROPHILS 2.1 1.8 - 8.0 K/UL    ABS. LYMPHOCYTES 2.1 0.9 - 3.6 K/UL    ABS. MONOCYTES 0.7 0.05 - 1.2 K/UL    ABS. EOSINOPHILS 0.3 0.0 - 0.4 K/UL    ABS.  BASOPHILS 0.0 0.0 - 0.1 K/UL    DF AUTOMATED     LIPID PANEL   Result Value Ref Range    LIPID PROFILE          Cholesterol, total 117 <200 MG/DL    Triglyceride 83 <150 MG/DL    HDL Cholesterol 48 40 - 60 MG/DL    LDL, calculated 52.4 0 - 100 MG/DL    VLDL, calculated 16.6 MG/DL    CHOL/HDL Ratio 2.4 0 - 5.0     HEMOGLOBIN A1C WITH EAG   Result Value Ref Range    Hemoglobin A1c 6.6 (H) 4.2 - 5.6 %    Est. average glucose 143 mg/dL   URINE MICROSCOPIC ONLY   Result Value Ref Range    WBC 0 to 3 0 - 4 /hpf    RBC NEGATIVE  0 - 5 /hpf    Epithelial cells FEW 0 - 5 /lpf    Bacteria NEGATIVE  NEG /hpf     Assessment: #1.  Diabetes very well controlled, she will continue diabetic diet and metformin  mg each morning. #2. Hyperlipidemia doing very well, she will continue simvastatin 80 mg each evening. #3.  Peripheral arterial disease with poor pulses in the feet, but no intermittent claudication currently. She will continue Plavix and aspirin. #4.  Recurrent vertigo for many years, she will continue meclizine as needed and be very careful with walking to avoid falls. #5.  Chronic constipation, doing much better since she has been using milk of magnesia every day. She will get a flu shot now and follow-up with lab in 6 months. Patrick Velasco MD FACP    Please note: This document has been produced using voice recognition software. Unrecognized errors in transcription may be present.

## 2019-09-25 NOTE — PROGRESS NOTES
Ce Freed presents today for   Chief Complaint   Patient presents with    Physical     labs done              Depression Screening:  3 most recent PHQ Screens 5/23/2019   Little interest or pleasure in doing things Not at all   Feeling down, depressed, irritable, or hopeless Not at all   Total Score PHQ 2 0       Learning Assessment:  Learning Assessment 2/11/2015   PRIMARY LEARNER Patient   HIGHEST LEVEL OF EDUCATION - PRIMARY LEARNER  -   BARRIERS PRIMARY LEARNER NONE   PRIMARY LANGUAGE ENGLISH   LEARNER PREFERENCE PRIMARY LISTENING   ANSWERED BY patient   RELATIONSHIP SELF       Abuse Screening:  Abuse Screening Questionnaire 8/14/2018   Do you ever feel afraid of your partner? N   Are you in a relationship with someone who physically or mentally threatens you? N   Is it safe for you to go home? Y       Fall Risk  Fall Risk Assessment, last 12 mths 5/23/2019   Able to walk? Yes   Fall in past 12 months? No   Fall with injury? -   Number of falls in past 12 months -   Fall Risk Score -           Coordination of Care:  1. Have you been to the ER, urgent care clinic since your last visit? Hospitalized since your last visit? no    2. Have you seen or consulted any other health care providers outside of the [address] [place] [address] since your last visit? Include any pap smears or colon screening. no    Ce Frede is a 80 y.o. female who presents for routine immunizations. High Dose Influenza  She denies any symptoms , reactions or allergies that would exclude them from being immunized today. Risks and adverse reactions were discussed and the VIS was given to them. All questions were addressed. She was observed for 15 min post injection. There were no reactions observed.     Nima Fatima LPN

## 2019-09-25 NOTE — PATIENT INSTRUCTIONS
Vaccine Information Statement Influenza (Flu) Vaccine (Inactivated or Recombinant): What You Need to Know Many Vaccine Information Statements are available in [place] and other languages. See www.immunize.org/vis Hojas de información sobre vacunas están disponibles en español y en muchos otros idiomas. Visite www.immunize.org/vis 1. Why get vaccinated? Influenza vaccine can prevent influenza (flu). Flu is a contagious disease that spreads around the [facility] every year, usually between October and May. Anyone can get the flu, but it is more dangerous for some people. Infants and young children, people 72years of age and older, pregnant women, and people with certain health conditions or a weakened immune system are at greatest risk of flu complications. Pneumonia, bronchitis, sinus infections and ear infections are examples of flu-related complications. If you have a medical condition, such as heart disease, cancer or diabetes, flu can make it worse. Flu can cause fever and chills, sore throat, muscle aches, fatigue, cough, headache, and runny or stuffy nose. Some people may have vomiting and diarrhea, though this is more common in children than adults. Each year thousands of people in the [facility] die from flu, and many more are hospitalized. Flu vaccine prevents millions of illnesses and flu-related visits to the doctor each year. 2. Influenza vaccines CDC recommends everyone 10months of age and older get vaccinated every flu season. Children 6 months through 6years of age may need 2 doses during a single flu season. Everyone else needs only 1 dose each flu season. It takes about 2 weeks for protection to develop after vaccination. There are many flu viruses, and they are always changing. Each year a new flu vaccine is made to protect against three or four viruses that are likely to cause disease in the upcoming flu season.  Even when the vaccine doesnt exactly match these viruses, it may still provide some protection. Influenza vaccine does not cause flu. Influenza vaccine may be given at the same time as other vaccines. 3. Talk with your health care provider Tell your vaccine provider if the person getting the vaccine: 
 Has had an allergic reaction after a previous dose of influenza vaccine, or has any severe, life-threatening allergies.  Has ever had Guillain-Barré Syndrome (also called GBS). In some cases, your health care provider may decide to postpone influenza vaccination to a future visit. People with minor illnesses, such as a cold, may be vaccinated. People who are moderately or severely ill should usually wait until they recover before getting influenza vaccine. Your health care provider can give you more information. 4. Risks of a reaction  Soreness, redness, and swelling where shot is given, fever, muscle aches, and headache can happen after influenza vaccine.  There may be a very small increased risk of Guillain-Barré Syndrome (GBS) after inactivated influenza vaccine (the flu shot). Eilleen Grumbling children who get the flu shot along with pneumococcal vaccine (PCV13), and/or DTaP vaccine at the same time might be slightly more likely to have a seizure caused by fever. Tell your health care provider if a child who is getting flu vaccine has ever had a seizure. People sometimes faint after medical procedures, including vaccination. Tell your provider if you feel dizzy or have vision changes or ringing in the ears. As with any medicine, there is a very remote chance of a vaccine causing a severe allergic reaction, other serious injury, or death. 5. What if there is a serious problem? An allergic reaction could occur after the vaccinated person leaves the clinic.  If you see signs of a severe allergic reaction (hives, swelling of the face and throat, difficulty breathing, a fast heartbeat, dizziness, or weakness), call 9-1-1 and get the person to the nearest hospital. 
 
For other signs that concern you, call your health care provider. Adverse reactions should be reported to the Vaccine Adverse Event Reporting System (VAERS). Your health care provider will usually file this report, or you can do it yourself. Visit the VAERS website at www.vaers. hhs.gov or call 6-637.421.3311. VAERS is only for reporting reactions, and VAERS staff do not give medical advice. 6. The National Vaccine Injury Compensation Program 
 
The [facility] Vaccine Injury Compensation Program (VICP) is a federal program that was created to compensate people who may have been injured by certain vaccines. Visit the VICP website at www.[facility].gov/vaccinecompensation or call 1-405.781.4567 to learn about the program and about filing a claim. There is a time limit to file a claim for compensation. 7. How can I learn more?  Ask your health care provider.  Call your local or state health department.  Contact the Centers for Disease Control and Prevention (CDC): 
- Call 3-165.249.7135 (1-800-CDC-INFO) or 
- Visit CDCs influenza website at www.cdc.gov/flu Vaccine Information Statement (Interim) Inactivated Influenza Vaccine 8/15/2019 
[address] T[address] [place] [id number] Department of Health and [employer] Centers for Disease Control and Prevention Office Use Only

## 2019-10-16 ENCOUNTER — TELEPHONE (OUTPATIENT)
Dept: INTERNAL MEDICINE CLINIC | Age: 84
End: 2019-10-16

## 2019-10-16 NOTE — TELEPHONE ENCOUNTER
Pt calling, says she has saw rm a couple weeks ago. Says she is having a bad headache. She called neurologist and he is not in the office. Wants to know what she is supposed to do. She is worried about another stroke. Refused ov til she hears from  on what to do.

## 2019-10-16 NOTE — TELEPHONE ENCOUNTER
Pt aware of message below and verbalized understanding. No further questions or concerns from pt at this time.      Number given for Dr. Connie Wynn she will call to schedule an appointment If headache persist.

## 2019-10-16 NOTE — TELEPHONE ENCOUNTER
Marlo Jarvis MD  [facility] Nurs[facility] 2 hours ago (9:23 AM)      She may use Tylenol 500 mg or ibuprofen 600 mg and see if 1 of those will relieve the head pain.  Having a headache is not related to impending stroke.  If the head pain is not clearing I would recommend calling Dr. Teofilo Schultz office again, somebody should be able to help her.     Routing comment

## 2019-10-31 ENCOUNTER — OFFICE VISIT (OUTPATIENT)
Dept: NEUROLOGY | Age: 84
End: 2019-10-31

## 2019-10-31 VITALS
DIASTOLIC BLOOD PRESSURE: 62 MMHG | BODY MASS INDEX: 28.79 KG/M2 | TEMPERATURE: 98.6 F | SYSTOLIC BLOOD PRESSURE: 106 MMHG | RESPIRATION RATE: 20 BRPM | OXYGEN SATURATION: 97 % | HEART RATE: 72 BPM | HEIGHT: 59 IN | WEIGHT: 142.8 LBS

## 2019-10-31 DIAGNOSIS — R42 DIZZY: ICD-10-CM

## 2019-10-31 DIAGNOSIS — R42 LIGHTHEADED: ICD-10-CM

## 2019-10-31 DIAGNOSIS — I63.30 CEREBROVASCULAR ACCIDENT (CVA) DUE TO THROMBOSIS OF CEREBRAL ARTERY (HCC): Primary | ICD-10-CM

## 2019-10-31 NOTE — PROGRESS NOTES
Cinthya Wilde is a 80 y.o. female accompainied by turner Juarez in today for follow-up on CVA. Learning assessment previously completed; primary language is [place]. 1. Have you been to the ER, urgent care clinic since your last visit? Hospitalized since your last visit? No    2. Have you seen or consulted any other health care providers outside of the [address] [place] [address] since your last visit? Include any pap smears or colon screening.  No

## 2019-10-31 NOTE — PROGRESS NOTES
Re:  Padmini Ellis up visit     10/31/2019 9:33 AM    SSN: [id number]    Subjective:   Inga Sees returns today for follow up. She continues to complain of dizziness in the head. She feels a combination of lightheaded and some vertigo. This seems to get worse with position changes. This symptoms persists, now 2 months after I discharged her from this office. Medications:    Current Outpatient Medications   Medication Sig Dispense Refill    meclizine (ANTIVERT) 25 mg tablet TAKE 1 TAB BY MOUTH THREE (3) TIMES DAILY AS NEEDED FOR DIZZINESS. 270 Tab 0    metFORMIN ER (GLUCOPHAGE XR) 500 mg tablet Take 1 Tab by mouth daily (with breakfast). 90 Tab 3    aspirin delayed-release 81 mg tablet Take 1 Tab by mouth daily. 100 Tab 11    clopidogrel (PLAVIX) 75 mg tab 1 po qday 30 Tab 11    simvastatin (ZOCOR) 80 mg tablet Take 1 Tab by mouth daily. 90 Tab 3    magnesium hydroxide (KATE MILK OF MAGNESIA) 400 mg/5 mL suspension Take 30 mL by mouth daily as needed for Constipation.          Vital signs:    Visit Vitals  /62 (BP 1 Location: Right arm, BP Patient Position: Sitting)   Pulse 72   Temp 98.6 °F (37 °C) (Oral)   Resp 20   Ht 4' 11\" (1.499 m)   Wt 64.8 kg (142 lb 12.8 oz)   SpO2 97%   BMI 28.84 kg/m²     Supine /70 P 67  Standing /68 P 74    Review of Systems:   As above otherwise 11 point review of systems negative including;   Constitutional no fever or chills  Skin denies rash or itching  HEENT  Denies tinnitus, hearing lose  Eyes denies diplopia vision lose  Respiratory denies sortness of breath  Cardiovascular denies chest pain, dyspnea on exertion  Gastrointestinal denies nausea, vomiting, diarrhea, constipation  Genitourinary denies incontinence  Musculoskeletal denies joint pain or swelling  Endocrine denies weight change  Hematology denies easy bruising or bleeding   Neurological as above in HPI      Patient Active Problem List   Diagnosis Code    Aortic valve disorders I35.9    Vasovagal syncope R55    Chronic constipation K59.09    Essential hypertension with goal blood pressure less than 140/90 I10    Weakness generalized R53.1    Primary osteoarthritis involving multiple joints M15.0    Benign paroxysmal positional vertigo H81.10    Hypovitaminosis D E55.9    Antiplatelet or antithrombotic long-term use Z79.02    Cerebrovascular accident (CVA) due to thrombosis of cerebral artery (HCC) I63.30    Dyslipidemia, goal LDL below 70 E78.5    Chronic anxiety F41.9    Bleeding external hemorrhoids K64.4    Chronic vertigo R42    Type 2 diabetes mellitus with peripheral vascular disease (HCC) E11.51         Objective: The patient is awake, alert, and oriented x 4. Fund of knowledge is adequate. Speech is fluent and memory is intact. Cranial Nerves: II - Visual fields are full to confrontation. III, IV, VI - Extraocular movements are intact. There is no nystagmus. V - Facial sensation is intact to pinprick. VII - Face is symmetrical.  VIII - Hearing is present. IX, X, XII - Palate is symmetrical.   XI - Shoulder shrugging and head turning intact  Motor: The patient moves all four limbs fairly well and symmetrically. Tone is normal. Reflexes are 2+ and symmetrical. Plantars are down going. Gait is normal.  Noted is she just starts walking, doesn't stand and walk.       CBC:   Lab Results   Component Value Date/Time    WBC 5.3 09/13/2019 08:46 AM    RBC 5.08 09/13/2019 08:46 AM    HGB 14.0 09/13/2019 08:46 AM    HCT 43.6 09/13/2019 08:46 AM    PLATELET 595 64/06/8832 08:46 AM     BMP:   Lab Results   Component Value Date/Time    Glucose 112 (H) 09/13/2019 08:46 AM    Sodium 143 09/13/2019 08:46 AM    Potassium 4.1 09/13/2019 08:46 AM    Chloride 111 09/13/2019 08:46 AM    CO2 25 09/13/2019 08:46 AM    BUN 9 09/13/2019 08:46 AM    Creatinine 0.97 09/13/2019 08:46 AM    Calcium 9.1 09/13/2019 08:46 AM     CMP:   Lab Results   Component Value Date/Time    Glucose 112 (H) 09/13/2019 08:46 AM    Sodium 143 09/13/2019 08:46 AM    Potassium 4.1 09/13/2019 08:46 AM    Chloride 111 09/13/2019 08:46 AM    CO2 25 09/13/2019 08:46 AM    BUN 9 09/13/2019 08:46 AM    Creatinine 0.97 09/13/2019 08:46 AM    Calcium 9.1 09/13/2019 08:46 AM    Anion gap 7 09/13/2019 08:46 AM    BUN/Creatinine ratio 9 (L) 09/13/2019 08:46 AM    Alk. 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 She also has some orthostatic hypotension, and considering her advanced age, I suspect her complaints are related to the orthostatics. Plan:  Persistent dizziness/vertigo. Will try vestibular rehab. RTC in 2 months. Sincerely,        Geovanni Dodson M.D.

## 2019-11-06 ENCOUNTER — TELEPHONE (OUTPATIENT)
Dept: NEUROLOGY | Age: 84
End: 2019-11-06

## 2019-11-08 ENCOUNTER — APPOINTMENT (OUTPATIENT)
Dept: PHYSICAL THERAPY | Age: 84
End: 2019-11-08

## 2019-11-14 ENCOUNTER — APPOINTMENT (OUTPATIENT)
Dept: PHYSICAL THERAPY | Age: 84
End: 2019-11-14

## 2019-11-17 RX ORDER — SIMVASTATIN 80 MG/1
TABLET, FILM COATED ORAL
Qty: 90 TAB | Refills: 3 | Status: SHIPPED | OUTPATIENT
Start: 2019-11-17 | End: 2020-09-29 | Stop reason: SDUPTHER

## 2019-12-03 ENCOUNTER — TELEPHONE (OUTPATIENT)
Dept: INTERNAL MEDICINE CLINIC | Age: 84
End: 2019-12-03

## 2019-12-03 NOTE — TELEPHONE ENCOUNTER
Patient is asking for RM to call her at home before he leaves office today. , wouldn't tell me much other than it is about dehydration? ?

## 2019-12-04 NOTE — TELEPHONE ENCOUNTER
Called spoke with patient gave message below. She states that she drinks water constantly and she still feels dehydrated. She also voiced concerns about chronic constipation. She would not go in to detail she just stated something's not right. I offered appointment with Dr. Sirena Britt so she could come in and discuss her concerns in person.  Patient scheduled for 12/3/19 at 11am.

## 2019-12-04 NOTE — TELEPHONE ENCOUNTER
Yosef Vegas MD  [facility] 7 hours ago (9:35 AM)      When I saw her in late September she was not dehydrated, her labs showed she was not dehydrated, but if she is concerned about dehydration she should drink enough fluids so that she urinates every 3 hours during the daytime and less often at night.  She should drink when she is thirsty but does not need to overdo it with fluids.

## 2019-12-05 ENCOUNTER — TELEPHONE (OUTPATIENT)
Dept: INTERNAL MEDICINE CLINIC | Age: 84
End: 2019-12-05

## 2019-12-05 ENCOUNTER — HOSPITAL ENCOUNTER (OUTPATIENT)
Dept: PHYSICAL THERAPY | Age: 84
Discharge: HOME | End: 2019-12-05
Payer: MEDICARE

## 2019-12-05 ENCOUNTER — TELEPHONE (OUTPATIENT)
Dept: PHYSICAL THERAPY | Age: 84
End: 2019-12-05

## 2019-12-05 PROCEDURE — 97162 PT EVAL MOD COMPLEX 30 MIN: CPT

## 2019-12-05 PROCEDURE — 97530 THERAPEUTIC ACTIVITIES: CPT

## 2019-12-05 NOTE — PROGRESS NOTES
[facility]  [employer] [facility]  [address] [facility]  [phone] fax    Plan of Care/ Statement of Necessity for Physical Therapy Services    Patient name: Marlin Mchuhg Start of Care: 2019   Referral source: Shay Garcia MD : 1926    Medical Diagnosis: Dizziness and giddiness [R42]  Payor: [place] MEDICARE / Plan: VA MEDICARE PART A & B / Product Type: Medicare /  Onset Date:15 years     Treatment Diagnosis: Dizziness, Balance    Prior Hospitalization: see medical history Provider#: [id number]   Medications: Verified on Patient summary List    Comorbidities: HTN, type II diabetes with peripheral vascular disease, vasovagal syncope, aortic valve disorder, hx of BPPV, chronic vertigo, chronic anxiety    Prior Level of Function: lives alone in a 1 story home with 2-3 steps to enter without handrail,  Right handed, Ind with ADLs, Ind with household management, walks 1.5 miles around the mall 2-3x per week       The Plan of Care and following information is based on the information from the initial evaluation. Assessment/ key information: Pt is a 79 yo F who presents with complaints of dizziness for ~15 years. She has been taking Meclizine 3x per day for  ~15 years, and her MD is recommending trial of vestibular rehab. She has not had vestibular rehab in the past.  She also had a CVA 1.5 years ago, but reports the dizzy sx were not changed with the stroke. She has had a few falls, the last one was 3-4 years ago when she blacked out upon standing. She was taken to the emergency room but they discharged her that day without finding the cause of blacking out per subjective reports. She was asked multiple times to describe dizziness but kept repeating \"it's hard to describe\". Pt reported discomfort in her neck and \"dizziness\" in her eyes. Dizziness is intermittent and is aggravated with wearing her wig.   She does not wear her wig at home and she is not open to going without a wig, trying a different wig, or wearing a hat in the community. Pt ambulates without AD with slightly decreased ranjeet and slightly wide JASMYNE. No significant change in BP with sit to stand transition. Pt presents with mild strength deficits in BLE. Sensation and coordination of BLE are WFL. Pt is able to maintain Romberg EO/EC on compliant and non-compliance surface for 30 seconds, with increased sway noted with EC on compliant surface. Pt reported mild sx with VOR with fast head movements and with sitting up from right Hidalgo-Hallpike, and symptoms subsided almost immediately. No nystagmus noted. Will perform SOT in future sessions for further objective assessment of balance. Pt will benefit from skilled PT to address dizzy sx and static/dynamic balance deficits in order to improve ease/safety with daily tasks. Evaluation Complexity History HIGH Complexity :3+ comorbidities / personal factors will impact the outcome/ POC ; Examination HIGH Complexity : 4+ Standardized tests and measures addressing body structure, function, activity limitation and / or participation in recreation  ;Presentation MEDIUM Complexity : Evolving with changing characteristics  ; Clinical Decision Making MEDIUM Complexity : FOTO score of 26-74  Overall Complexity Rating: MEDIUM  Problem List: decrease ROM, decrease strength, impaired gait/ balance, decrease ADL/ functional abilitiies, decrease activity tolerance and decrease flexibility/ joint mobility   Treatment Plan may include any combination of the following: Therapeutic exercise, Therapeutic activities, Neuromuscular re-education, Physical agent/modality, Gait/balance training, Manual therapy, Patient education, Self Care training, Functional mobility training, Home safety training and Stair training  Patient / Family readiness to learn indicated by: asking questions, trying to perform skills and interest  Persons(s) to be included in education: patient (P) and family support person (FSP);list niece  Barriers to Learning/Limitations: None  Patient Goal (s): to be relieved of dizziness and pressure  Patient Self Reported Health Status: fair  Rehabilitation Potential: good    Short Term Goals: To be accomplished in 1 weeks:  1. Pt will be compliant with initial HEP in order to optimize therapy outcomes. Long Term Goals: To be accomplished in 4 weeks:  1. Pt will improve FOTO by 3 points in order to demonstrate functional improvement. 2.  Pt will report 50% improvement in dizzy sx in order to improve QOL. 3.  Pt will perform SOT WNL compared to closest age-related norms in order to demonstrate reduced fall risk. Frequency / Duration: Patient to be seen 2-3 times per week for 4 weeks. Patient/ Caregiver education and instruction: Diagnosis, prognosis, activity modification and exercises   [x]  Plan of care has been reviewed with PTA    Certification Period: 12/5/19 to 1/3/20  Joanne Siu PT 12/5/2019 9:15 AM    ________________________________________________________________________    I certify that the above Therapy Services are being furnished while the patient is under my care. I agree with the treatment plan and certify that this therapy is necessary.     Physician's Signature:____________Date:_________TIME:________    ** Signature, Date and Time must be completed for valid certification **    Please sign and return to [facility] [employer] [facility]RE[facility] Haylee DANIEL  [address] [place] [address]  [phone] fax

## 2019-12-05 NOTE — TELEPHONE ENCOUNTER
[facility] pt for dizzy and imbalance    She thinks meclozine is masking some of her impairments    Would be be willing to decrease while in vestibular rehab?

## 2019-12-06 ENCOUNTER — TELEPHONE (OUTPATIENT)
Dept: INTERNAL MEDICINE CLINIC | Age: 84
End: 2019-12-06

## 2019-12-06 ENCOUNTER — TELEPHONE (OUTPATIENT)
Dept: PHYSICAL THERAPY | Age: 84
End: 2019-12-06

## 2019-12-06 ENCOUNTER — OFFICE VISIT (OUTPATIENT)
Dept: INTERNAL MEDICINE CLINIC | Age: 84
End: 2019-12-06

## 2019-12-06 ENCOUNTER — HOSPITAL ENCOUNTER (OUTPATIENT)
Dept: LAB | Age: 84
Discharge: HOME | End: 2019-12-06
Payer: MEDICARE

## 2019-12-06 VITALS
DIASTOLIC BLOOD PRESSURE: 70 MMHG | SYSTOLIC BLOOD PRESSURE: 128 MMHG | RESPIRATION RATE: 12 BRPM | BODY MASS INDEX: 28.63 KG/M2 | HEIGHT: 59 IN | WEIGHT: 142 LBS | OXYGEN SATURATION: 98 % | HEART RATE: 81 BPM | TEMPERATURE: 98.1 F

## 2019-12-06 DIAGNOSIS — R42 DIZZINESS: ICD-10-CM

## 2019-12-06 DIAGNOSIS — K59.09 CHRONIC CONSTIPATION: Primary | ICD-10-CM

## 2019-12-06 DIAGNOSIS — E78.5 DYSLIPIDEMIA, GOAL LDL BELOW 70: ICD-10-CM

## 2019-12-06 DIAGNOSIS — I10 ESSENTIAL HYPERTENSION WITH GOAL BLOOD PRESSURE LESS THAN 140/90: ICD-10-CM

## 2019-12-06 DIAGNOSIS — E11.9 TYPE 2 DIABETES MELLITUS WITHOUT COMPLICATION, WITHOUT LONG-TERM CURRENT USE OF INSULIN (HCC): ICD-10-CM

## 2019-12-06 DIAGNOSIS — R63.4 WEIGHT LOSS: ICD-10-CM

## 2019-12-06 LAB
ALBUMIN SERPL-MCNC: 4.1 G/DL (ref 3.4–5)
ALBUMIN/GLOB SERPL: 1.1 {RATIO} (ref 0.8–1.7)
ALP SERPL-CCNC: 92 U/L (ref 45–117)
ALT SERPL-CCNC: 21 U/L (ref 13–56)
ANION GAP SERPL CALC-SCNC: 5 MMOL/L (ref 3–18)
AST SERPL-CCNC: 23 U/L (ref 10–38)
BASOPHILS # BLD: 0 K/UL (ref 0–0.1)
BASOPHILS NFR BLD: 0 % (ref 0–2)
BILIRUB SERPL-MCNC: 0.3 MG/DL (ref 0.2–1)
BUN SERPL-MCNC: 10 MG/DL (ref 7–18)
BUN/CREAT SERPL: 11 (ref 12–20)
CALCIUM SERPL-MCNC: 9.3 MG/DL (ref 8.5–10.1)
CHLORIDE SERPL-SCNC: 108 MMOL/L (ref 100–111)
CO2 SERPL-SCNC: 26 MMOL/L (ref 21–32)
CREAT SERPL-MCNC: 0.92 MG/DL (ref 0.6–1.3)
DIFFERENTIAL METHOD BLD: ABNORMAL
EOSINOPHIL # BLD: 0.2 K/UL (ref 0–0.4)
EOSINOPHIL NFR BLD: 3 % (ref 0–5)
ERYTHROCYTE [DISTWIDTH] IN BLOOD BY AUTOMATED COUNT: 14 % (ref 11.6–14.5)
EST. AVERAGE GLUCOSE BLD GHB EST-MCNC: 137 MG/DL
GLOBULIN SER CALC-MCNC: 3.8 G/DL (ref 2–4)
GLUCOSE SERPL-MCNC: 123 MG/DL (ref 74–99)
HBA1C MFR BLD: 6.4 % (ref 4.2–5.6)
HCT VFR BLD AUTO: 44 % (ref 35–45)
HGB BLD-MCNC: 14 G/DL (ref 12–16)
LYMPHOCYTES # BLD: 2.7 K/UL (ref 0.9–3.6)
LYMPHOCYTES NFR BLD: 41 % (ref 21–52)
MCH RBC QN AUTO: 27.5 PG (ref 24–34)
MCHC RBC AUTO-ENTMCNC: 31.8 G/DL (ref 31–37)
MCV RBC AUTO: 86.3 FL (ref 74–97)
MONOCYTES # BLD: 0.9 K/UL (ref 0.05–1.2)
MONOCYTES NFR BLD: 13 % (ref 3–10)
NEUTS SEG # BLD: 2.9 K/UL (ref 1.8–8)
NEUTS SEG NFR BLD: 43 % (ref 40–73)
PLATELET # BLD AUTO: 221 K/UL (ref 135–420)
PMV BLD AUTO: 11.2 FL (ref 9.2–11.8)
POTASSIUM SERPL-SCNC: 4 MMOL/L (ref 3.5–5.5)
PROT SERPL-MCNC: 7.9 G/DL (ref 6.4–8.2)
RBC # BLD AUTO: 5.1 M/UL (ref 4.2–5.3)
SODIUM SERPL-SCNC: 139 MMOL/L (ref 136–145)
TSH SERPL DL<=0.05 MIU/L-ACNC: 0.83 UIU/ML (ref 0.36–3.74)
WBC # BLD AUTO: 6.7 K/UL (ref 4.6–13.2)

## 2019-12-06 PROCEDURE — 83036 HEMOGLOBIN GLYCOSYLATED A1C: CPT

## 2019-12-06 PROCEDURE — 80053 COMPREHEN METABOLIC PANEL: CPT

## 2019-12-06 PROCEDURE — 84443 ASSAY THYROID STIM HORMONE: CPT

## 2019-12-06 PROCEDURE — 84439 ASSAY OF FREE THYROXINE: CPT

## 2019-12-06 PROCEDURE — 85025 COMPLETE CBC W/AUTO DIFF WBC: CPT

## 2019-12-06 NOTE — PROGRESS NOTES
Yeison Ibanez 5/17/1926, is a 80 y.o. female, who is seen today for a several week history of feeling that she is dehydrated, primarily she wakes up at night with her mouth open and her mouth dry. Her weight is stable. She drinks enough fluids in the daytime and eats quite well. She is also still concerned about glucose running too high or too low in the daytime though she has no symptoms and she is using metformin regularly and eating a diabetic diet. She is a little constipated sometimes and uses MiraLAX when needed. No abdominal pain and no blood per rectum. No syncope or near syncope. She does want me to listen to her heart today also. 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     Essential hypertension, benign     Stable     Hypercholesterolemia      Current Outpatient Medications   Medication Sig Dispense Refill    meclizine (ANTIVERT) 25 mg tablet TAKE 1 TAB BY MOUTH THREE (3) TIMES DAILY AS NEEDED FOR DIZZINESS. 90 Tab 5    simvastatin (ZOCOR) 80 mg tablet TAKE 1 TABLET BY MOUTH EVERY DAY 90 Tab 3    metFORMIN ER (GLUCOPHAGE XR) 500 mg tablet Take 1 Tab by mouth daily (with breakfast). 90 Tab 3    aspirin delayed-release 81 mg tablet Take 1 Tab by mouth daily. 100 Tab 11    clopidogrel (PLAVIX) 75 mg tab 1 po qday 30 Tab 11    magnesium hydroxide (KATE MILK OF MAGNESIA) 400 mg/5 mL suspension Take 30 mL by mouth daily as needed for Constipation.        Results for orders placed or performed during the hospital encounter of 87/81/42   METABOLIC PANEL, COMPREHENSIVE   Result Value Ref Range    Sodium 143 136 - 145 mmol/L    Potassium 4.1 3.5 - 5.5 mmol/L    Chloride 111 100 - 111 mmol/L    CO2 25 21 - 32 mmol/L    Anion gap 7 3.0 - 18 mmol/L    Glucose 112 (H) 74 - 99 mg/dL    BUN 9 7.0 - 18 MG/DL    Creatinine 0.97 0.6 - 1.3 MG/DL    BUN/Creatinine ratio 9 (L) 12 - 20      GFR est AA >60 >60 ml/min/1.73m2    GFR est non-AA 54 (L) >60 ml/min/1.73m2    Calcium 9.1 8.5 - 10.1 MG/DL    Bilirubin, total 0.5 0.2 - 1.0 MG/DL    ALT (SGPT) 17 13 - 56 U/L    AST (SGOT) 20 10 - 38 U/L    Alk. phosphatase 84 45 - 117 U/L    Protein, total 7.4 6.4 - 8.2 g/dL    Albumin 3.9 3.4 - 5.0 g/dL    Globulin 3.5 2.0 - 4.0 g/dL    A-G Ratio 1.1 0.8 - 1.7     MICROALBUMIN, UR, RAND W/ MICROALB/CREAT RATIO   Result Value Ref Range    Microalbumin,urine random 0.98 0 - 3.0 MG/DL    Creatinine, urine 68.00 30 - 125 mg/dL    Microalbumin/Creat ratio (mg/g creat) 14 0 - 30 mg/g   URINALYSIS W/ RFLX MICROSCOPIC   Result Value Ref Range    Color YELLOW      Appearance CLEAR      Specific gravity 1.013 1.005 - 1.030      pH (UA) 7.5 5.0 - 8.0      Protein NEGATIVE  NEG mg/dL    Glucose NEGATIVE  NEG mg/dL    Ketone NEGATIVE  NEG mg/dL    Bilirubin NEGATIVE  NEG      Blood NEGATIVE  NEG      Urobilinogen 0.2 0.2 - 1.0 EU/dL    Nitrites NEGATIVE  NEG      Leukocyte Esterase TRACE (A) NEG     T4, FREE   Result Value Ref Range    T4, Free 0.9 0.7 - 1.5 NG/DL   TSH 3RD GENERATION   Result Value Ref Range    TSH 0.92 0.36 - 3.74 uIU/mL   CBC WITH AUTOMATED DIFF   Result Value Ref Range    WBC 5.3 4.6 - 13.2 K/uL    RBC 5.08 4.20 - 5.30 M/uL    HGB 14.0 12.0 - 16.0 g/dL    HCT 43.6 35.0 - 45.0 %    MCV 85.8 74.0 - 97.0 FL    MCH 27.6 24.0 - 34.0 PG    MCHC 32.1 31.0 - 37.0 g/dL    RDW 13.5 11.6 - 14.5 %    PLATELET 743 359 - 529 K/uL    MPV 11.1 9.2 - 11.8 FL    NEUTROPHILS 41 40 - 73 %    LYMPHOCYTES 39 21 - 52 %    MONOCYTES 13 (H) 3 - 10 %    EOSINOPHILS 6 (H) 0 - 5 %    BASOPHILS 1 0 - 2 %    ABS. NEUTROPHILS 2.1 1.8 - 8.0 K/UL    ABS. LYMPHOCYTES 2.1 0.9 - 3.6 K/UL    ABS. MONOCYTES 0.7 0.05 - 1.2 K/UL    ABS. EOSINOPHILS 0.3 0.0 - 0.4 K/UL    ABS.  BASOPHILS 0.0 0.0 - 0.1 K/UL    DF AUTOMATED     LIPID PANEL   Result Value Ref Range    LIPID PROFILE          Cholesterol, total 117 <200 MG/DL    Triglyceride 83 <150 MG/DL    HDL Cholesterol 48 40 - 60 MG/DL    LDL, calculated 52.4 0 - 100 MG/DL    VLDL, calculated 16.6 MG/DL    CHOL/HDL Ratio 2.4 0 - 5.0     HEMOGLOBIN A1C WITH EAG   Result Value Ref Range    Hemoglobin A1c 6.6 (H) 4.2 - 5.6 %    Est. average glucose 143 mg/dL   URINE MICROSCOPIC ONLY   Result Value Ref Range    WBC 0 to 3 0 - 4 /hpf    RBC NEGATIVE  0 - 5 /hpf    Epithelial cells FEW 0 - 5 /lpf    Bacteria NEGATIVE  NEG /hpf     Visit Vitals  /70 (BP 1 Location: Left arm, BP Patient Position: Sitting)   Pulse 81   Temp 98.1 °F (36.7 °C) (Oral)   Resp 12   Ht 4' 11\" (1.499 m)   Wt 142 lb (64.4 kg)   SpO2 98%   BMI 28.68 kg/m²       Carotids are 2+ without bruits. No adenopathy or thyromegaly. Lungs are clear to percussion. Good breath sounds with no wheezing or crackles. Heart reveals a regular rhythm with normal S1 and S2 with not palpable. Abdomen is soft and nontender with no hepatosplenomegaly or masses and no bruits. Extremities reveal no clubbing cyanosis or edema. Pulses are 2+. Assessment:  #1. Dry mouth with no evidence of dehydration clinically previously or today. We will check chemistries today. #2.  Diabetes has been doing very well, we will check hemoglobin A1c today. #3.  Chronic sense of dizziness dating back many years, from before I started seeing her, she is now getting some physical therapy to try to help with that and the therapist recommended she stop meclizine so I told her I will be fine to try doing that. #4. She wanted me to check her heart and as above the rhythm is regular with no murmur gallop click or rub. We will call lab results when available, follow-up  FUNMILAYO Munoz MD FACP    Please note: This document has been produced using voice recognition software. Unrecognized errors in transcription may be present.

## 2019-12-07 LAB — T4 FREE SERPL-MCNC: 0.8 NG/DL (ref 0.7–1.5)

## 2019-12-09 ENCOUNTER — HOSPITAL ENCOUNTER (OUTPATIENT)
Dept: PHYSICAL THERAPY | Age: 84
Discharge: HOME | End: 2019-12-09
Payer: MEDICARE

## 2019-12-09 PROCEDURE — 97112 NEUROMUSCULAR REEDUCATION: CPT

## 2019-12-09 NOTE — PROGRESS NOTES
PT DAILY TREATMENT NOTE 10-18    Patient Name: Gerardo Gamez  Date:2019  : 1926  [x]  Patient  Verified  Payor: VA MEDICARE / Plan: VA MEDICARE PART A & B / Product Type: Medicare /    In CQQL:[id number]  Out time:1158  Total Treatment Time (min): 24  Visit #: 2 of 12    Medicare/BCBS Only   Total Timed Codes (min):  24 1:1 Treatment Time:  24       Treatment Area: Dizziness and giddiness [R42]    SUBJECTIVE  Pain Level (0-10 scale): 0/10  Any medication changes, allergies to medications, adverse drug reactions, diagnosis change, or new procedure performed?: [x] No    [] Yes (see summary sheet for update)  Subjective functional status/changes:   [] No changes reported  Pt reports she has a light headed feeling mostly when she wakes up in the morning. She feels like her wig makes her dizzy also. Pt brought her meds to write down. OBJECTIVE      24 min Neuromuscular Re-education:  []  See flow sheet :   Rationale: improve balance and increase proprioception  to improve the patients ability to ease with community ambulation. With   [] TE   [] TA   [] neuro   [] other: Patient Education: [x] Review HEP    [] Progressed/Changed HEP based on:   [] positioning   [] body mechanics   [] transfers   [] heat/ice application    [] other:      Other Objective/Functional Measures: SOT=58 . Decreased vestibular and Visual input , per SOT. Ambulate with head turns, vertical and horizontal, no dizziness. Pain Level (0-10 scale) post treatment: 0/10    ASSESSMENT/Changes in Function: Reports she has been taken off meclazine completely since Friday and feel good. She is doing well today, no dizziness. Patient will continue to benefit from skilled PT services to analyze and address soft tissue restrictions, analyze and cue movement patterns, analyze and modify body mechanics/ergonomics, assess and modify postural abnormalities and address imbalance/dizziness to attain remaining goals.      [x]  See Plan of Care  []  See progress note/recertification  []  See Discharge Summary         Progress towards goals / Updated goals:  1. Pt will be compliant with initial HEP in order to optimize therapy outcomes. Long Term Goals: To be accomplished in 4 weeks:  1. Pt will improve FOTO by 3 points in order to demonstrate functional improvement. 2.  Pt will report 50% improvement in dizzy sx in order to improve QOL. 3.  Pt will perform SOT WNL compared to closest age-related norms in order to demonstrate reduced fall risk. 12/9/19. Performed initial SOT.  12/9/19    PLAN  [x]  Upgrade activities as tolerated     [x]  Continue plan of care  []  Update interventions per flow sheet       []  Discharge due to:_  []  Other:_      Georgia Garay, PT 12/9/2019  2:30 PM    Future Appointments   Date Time Provider Jenny Mart   12/13/2019 10:00 AM Rama Rg, PT [facility]   12/17/2019  9:30 AM [facility]   12/19/2019 10:00 AM Emma Hammond PT [facility]   1/3/2020  8:30 AM Gt Henderson  E Mee    3/19/2020  8:45 AM IO NURSE VISIT [facility]   3/26/2020  8:30 AM Yessy Martins MD [facility]

## 2019-12-10 ENCOUNTER — TELEPHONE (OUTPATIENT)
Dept: INTERNAL MEDICINE CLINIC | Age: 84
End: 2019-12-10

## 2019-12-10 NOTE — TELEPHONE ENCOUNTER
Patient contacted, patient identified with two identifiers (Name & ). Patient aware of results per DR. Lewis and verbalizes understanding.

## 2019-12-10 NOTE — TELEPHONE ENCOUNTER
----- Message from Annetta Curran MD sent at 12/10/2019 10:12 AM EST -----  Please notify the patient that all of her blood work looks good and her glucose is doing very well. She does not need to be checking glucose levels at home.

## 2019-12-10 NOTE — PROGRESS NOTES
Please notify the patient that all of her blood work looks good and her glucose is doing very well. She does not need to be checking glucose levels at home.

## 2019-12-13 ENCOUNTER — HOSPITAL ENCOUNTER (OUTPATIENT)
Dept: PHYSICAL THERAPY | Age: 84
Discharge: HOME | End: 2019-12-13
Payer: MEDICARE

## 2019-12-13 PROCEDURE — 97112 NEUROMUSCULAR REEDUCATION: CPT

## 2019-12-13 NOTE — PROGRESS NOTES
PT DAILY TREATMENT NOTE 10-18    Patient Name: Vince Simpson  Date:2019  : 1926  [x]  Patient  Verified  Payor: VA MEDICARE / Plan: VA MEDICARE PART A & B / Product Type: Medicare /    In time:  Out time:1028  Total Treatment Time (min): 26  Visit #: 3 of 12    Medicare/BCBS Only   Total Timed Codes (min):  26 1:1 Treatment Time:  26       Treatment Area: Dizziness and giddiness [R42]    SUBJECTIVE  Pain Level (0-10 scale): 0/10  Any medication changes, allergies to medications, adverse drug reactions, diagnosis change, or new procedure performed?: [x] No    [] Yes (see summary sheet for update)  Subjective functional status/changes:   [] No changes reported  Pt stated that she is just having very minor dizziness today    OBJECTIVE    26 min Neuromuscular Re-education:  [x]  See flow sheet :   Rationale: improve coordination, improve balance and increase proprioception  to improve the patients ability to decrease fall risk    With   [] TE   [] TA   [x] neuro   [] other: Patient Education: [x] Review HEP    [] Progressed/Changed HEP based on:   [] positioning   [] body mechanics   [] transfers   [] heat/ice application    [] other:      Other Objective/Functional Measures:   Had increased dizziness with vertical VOR#1  Had increased dizziness with VOR CX  Romberg on foam with HT increased dizziness significantly  Pt was able to maintain balance without UE support for 30\" bilaterally     Pain Level (0-10 scale) post treatment: 810 dizziness    ASSESSMENT/Changes in Function:   Pt is progressing toward goals. Pt cont to have dizziness with vertical head turns. Pt reported that she continues to have increased dizziness with bending over.      Patient will continue to benefit from skilled PT services to modify and progress therapeutic interventions, address functional mobility deficits, analyze and cue movement patterns, address imbalance/dizziness and instruct in home and community integration to attain remaining goals. [x]  See Plan of Care  []  See progress note/recertification  []  See Discharge Summary         Progress towards goals / Updated goals:  1.  Pt will be compliant with initial HEP in order to optimize therapy outcomes. Long Term Goals: To be accomplished in 4 weeks:  1.  Pt will improve FOTO by 3 points in order to demonstrate functional improvement. 2.  Pt will report 50% improvement in dizzy sx in order to improve QOL. 3.  Pt will perform SOT WNL compared to closest age-related norms in order to demonstrate reduced fall risk. 12/9/19. Performed initial SOT.  12/9/19    PLAN  []  Upgrade activities as tolerated     [x]  Continue plan of care  []  Update interventions per flow sheet       []  Discharge due to:_  []  Other:_      Clemetine Check, PTA 12/13/2019  11:01 AM    Future Appointments   Date Time Provider Jenny Mart   12/17/2019  9:30 AM Simone Marrero, PT [facility]   12/19/2019 10:00 AM Kezia Castellanos, [facility]   1/3/2020  8:30 AM Arnetta Alpers,  E Mee    3/19/2020  8:45 AM IO NURSE VISIT [facility] LYLA SANDRA   3/26/2020  8:30 AM Mary Kay Mariscal MD [facility]

## 2019-12-17 ENCOUNTER — HOSPITAL ENCOUNTER (OUTPATIENT)
Dept: PHYSICAL THERAPY | Age: 84
Discharge: HOME | End: 2019-12-17
Payer: MEDICARE

## 2019-12-17 PROCEDURE — 97112 NEUROMUSCULAR REEDUCATION: CPT

## 2019-12-17 NOTE — PROGRESS NOTES
PT DAILY TREATMENT NOTE 10-18    Patient Name: Keegan Medeiros  Date:2019  : 1926  [x]  Patient  Verified  Payor: VA MEDICARE / Plan: VA MEDICARE PART A & B / Product Type: Medicare /    In time:930  Out time:958  Total Treatment Time (min): 28  Visit #: 4 of 12    Medicare/BCBS Only   Total Timed Codes (min):  28 1:1 Treatment Time:  28       Treatment Area: Dizziness and giddiness [R42]    SUBJECTIVE  Pain Level (0-10 scale): 3/10 dizziness  Any medication changes, allergies to medications, adverse drug reactions, diagnosis change, or new procedure performed?: [x] No    [] Yes (see summary sheet for update)  Subjective functional status/changes:   [] No changes reported  Pt stated that she cont to have dizziness with bending over to put on her shoes    OBJECTIVE    28 min Neuromuscular Re-education:  [x]  See flow sheet :   Rationale: improve coordination and improve balance  to improve the patients ability to decrease fall risk    With   [] TE   [] TA   [x] neuro   [] other: Patient Education: [x] Review HEP    [] Progressed/Changed HEP based on:   [] positioning   [] body mechanics   [] transfers   [] heat/ice application    [] other:      Other Objective/Functional Measures:   Had increased dizziness with VOR #1 with both motions  Cont to have increased dizziness with vertical head movements  No difficulty with dynamic gait, no LOB, but had increased dizziness     Pain Level (0-10 scale) post treatment: 3/10    ASSESSMENT/Changes in Function:   Pt is making slow progress toward goals. Pt cont to have increased dizziness with bending over and with vertical head turns. Patient will continue to benefit from skilled PT services to address functional mobility deficits, analyze and cue movement patterns and address imbalance/dizziness to attain remaining goals.      [x]  See Plan of Care  []  See progress note/recertification  []  See Discharge Summary         Progress towards goals / Updated goals: 1.  Pt will be compliant with initial HEP in order to optimize therapy outcomes. Long Term Goals: To be accomplished in 4 weeks:  1.  Pt will improve FOTO by 3 points in order to demonstrate functional improvement. 2.  Pt will report 50% improvement in dizzy sx in order to improve QOL. Progressing. 12/17/19  3.  Pt will perform SOT WNL compared to closest age-related norms in order to demonstrate reduced fall risk.   12/9/19. Performed initial SOT.  12/9/19    PLAN  []  Upgrade activities as tolerated     [x]  Continue plan of care  []  Update interventions per flow sheet       []  Discharge due to:_  []  Other:_      Jose Guadalupe Stone PTA 12/17/2019  11:24 AM    Future Appointments   Date Time Provider Jenny Mart   12/19/2019 10:00 AM Thanh Shore PTA [facility]   1/3/2020  8:30 AM Zaida Gaytan  [facility]   3/19/2020  8:45 AM IOC NURSE VISIT [facility]   3/26/2020  8:30 AM Jose Del Castillo MD [facility]

## 2019-12-19 ENCOUNTER — HOSPITAL ENCOUNTER (OUTPATIENT)
Dept: PHYSICAL THERAPY | Age: 84
Discharge: HOME | End: 2019-12-19
Payer: MEDICARE

## 2019-12-19 PROCEDURE — 97112 NEUROMUSCULAR REEDUCATION: CPT

## 2019-12-19 NOTE — PROGRESS NOTES
PT DAILY TREATMENT NOTE 10-18    Patient Name: Inga Zamarripa  Date:2019  : 1926  [x]  Patient  Verified  Payor: Sarah Hilliard / Plan: [place] MEDICARE PART A & B / Product Type: Medicare /    In time:1000  Out time:1030  Total Treatment Time (min): 30  Visit #: 5 of 12    Medicare/BCBS Only   Total Timed Codes (min):  30 1:1 Treatment Time:  30       Treatment Area: Dizziness and giddiness [R42]    SUBJECTIVE  Pain Level (0-10 scale): 310  Any medication changes, allergies to medications, adverse drug reactions, diagnosis change, or new procedure performed?: [x] No    [] Yes (see summary sheet for update)  Subjective functional status/changes:   [] No changes reported  Pt stated that her dizziness is about the same    OBJECTIVE    30 min Neuromuscular Re-education:  [x]  See flow sheet :   Rationale: improve coordination and improve balance  to improve the patients ability to decrease fall risk    With   [x] TE   [] TA   [] neuro   [] other: Patient Education: [x] Review HEP    [] Progressed/Changed HEP based on:   [] positioning   [] body mechanics   [] transfers   [] heat/ice application    [] other:      Other Objective/Functional Measures:   Had no LOB with static balance  Cont to have increased dizziness with vertical and horizontal HT  No difficulty with step taps or step ups  Pt is to be out of town until 20     Pain Level (0-10 scale) post treatment: 310 dizziness    ASSESSMENT/Changes in Function:   Pt is progressing slowly toward goals. Static balance is improving. Cont to have mild dizziness that increases with head turns and bending over. Pt is to be OOT until the first of January.  Pt is to see MD upon return and will call and let us know if she wishes to cont with therapy    Patient will continue to benefit from skilled PT services to modify and progress therapeutic interventions, address functional mobility deficits, address ROM deficits, assess and modify postural abnormalities, address imbalance/dizziness and instruct in home and community integration to attain remaining goals. [x]  See Plan of Care  []  See progress note/recertification  []  See Discharge Summary         Progress towards goals / Updated goals:  1.  Pt will be compliant with initial HEP in order to optimize therapy outcomes. Long Term Goals: To be accomplished in 4 weeks:  1.  Pt will improve FOTO by 3 points in order to demonstrate functional improvement. 2.  Pt will report 50% improvement in dizzy sx in order to improve QOL. Progressing. 12/17/19  3.  Pt will perform SOT WNL compared to closest age-related norms in order to demonstrate reduced fall risk.   12/9/19. Performed initial SOT.  12/9/19    PLAN  []  Upgrade activities as tolerated     [x]  Continue plan of care  []  Update interventions per flow sheet       []  Discharge due to:_  []  Other:_      Damien Ayo, PTA 12/19/2019  10:08 AM    Future Appointments   Date Time Provider Jenny Mart   1/3/2020  8:30 AM North Franco MD [address]   3/19/2020  8:45 AM [facility] NURSE VISIT [facility]   3/26/2020  8:30 AM Natividad Watson MD [facility]

## 2020-01-07 ENCOUNTER — TELEPHONE (OUTPATIENT)
Dept: INTERNAL MEDICINE CLINIC | Age: 85
End: 2020-01-07

## 2020-01-07 ENCOUNTER — OFFICE VISIT (OUTPATIENT)
Dept: INTERNAL MEDICINE CLINIC | Age: 85
End: 2020-01-07

## 2020-01-07 ENCOUNTER — HOSPITAL ENCOUNTER (OUTPATIENT)
Dept: GENERAL RADIOLOGY | Age: 85
Discharge: HOME | End: 2020-01-07
Payer: MEDICARE

## 2020-01-07 VITALS
OXYGEN SATURATION: 96 % | DIASTOLIC BLOOD PRESSURE: 64 MMHG | WEIGHT: 144.8 LBS | HEART RATE: 67 BPM | BODY MASS INDEX: 29.19 KG/M2 | RESPIRATION RATE: 12 BRPM | SYSTOLIC BLOOD PRESSURE: 129 MMHG | TEMPERATURE: 98.2 F | HEIGHT: 59 IN

## 2020-01-07 DIAGNOSIS — M25.552 ACUTE HIP PAIN, LEFT: ICD-10-CM

## 2020-01-07 DIAGNOSIS — M25.552 ACUTE HIP PAIN, LEFT: Primary | ICD-10-CM

## 2020-01-07 DIAGNOSIS — E11.51 TYPE 2 DIABETES MELLITUS WITH PERIPHERAL VASCULAR DISEASE (HCC): ICD-10-CM

## 2020-01-07 PROCEDURE — 73502 X-RAY EXAM HIP UNI 2-3 VIEWS: CPT

## 2020-01-07 RX ORDER — CYCLOBENZAPRINE HCL 10 MG
TABLET ORAL
COMMUNITY

## 2020-01-07 NOTE — PROGRESS NOTES
Lachelle Agosto 5/17/1926, is a 80 y.o. female, who is seen today for recent onset pain left hip. Last 4 days she has been having persistent pain but points to the initial tuberosity. Does not hurt anywhere else. It is worse when she walks and then when she is in better sets. She went to the urgent care center yesterday and was given a prescription for Flexeril and she has used 2 doses, she slept well last night and it has not made her particularly sleepy in the daytime. She has not used any other medicine. She says she thinks the pain might of come on because of trying to get onto her high bed but she does not recall any particular strain or sudden onset of the pain, just gradually developed 4 days ago. It has not changed much since then. 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     Essential hypertension, benign     Stable     Hypercholesterolemia      Current Outpatient Medications   Medication Sig Dispense Refill    cyclobenzaprine (FLEXERIL) 10 mg tablet Take  by mouth three (3) times daily as needed for Muscle Spasm(s).  meclizine (ANTIVERT) 25 mg tablet TAKE 1 TAB BY MOUTH THREE (3) TIMES DAILY AS NEEDED FOR DIZZINESS. 90 Tab 5    simvastatin (ZOCOR) 80 mg tablet TAKE 1 TABLET BY MOUTH EVERY DAY 90 Tab 3    metFORMIN ER (GLUCOPHAGE XR) 500 mg tablet Take 1 Tab by mouth daily (with breakfast). 90 Tab 3    aspirin delayed-release 81 mg tablet Take 1 Tab by mouth daily. 100 Tab 11    clopidogrel (PLAVIX) 75 mg tab 1 po qday 30 Tab 11    magnesium hydroxide (KATE MILK OF MAGNESIA) 400 mg/5 mL suspension Take 30 mL by mouth daily as needed for Constipation. Visit Vitals  /64 (BP 1 Location: Left arm, BP Patient Position: Sitting)   Pulse 67   Temp 98.2 °F (36.8 °C) (Oral)   Resp 12   Ht 4' 11\" (1.499 m)   Wt 144 lb 12.8 oz (65.7 kg)   SpO2 96%   BMI 29.25 kg/m²     Normal straight leg raising.   Tenderness over the ischium. Normal internal and external rotation for her age with some discomfort with external rotation posteriorly over the ischium. No clubbing cyanosis or edema. Assessment: Acute onset pain posterior left hip, possibly right over the ischium where she is tender. I recommended she use Flexeril at bedtime to help her sleep through this pain, that she is acetaminophen 1 g up to 4 times a day to control pain and we will x-ray the left hip/pelvis. Follow-up as previously planned or sooner if needed, we will call results of x-ray when available and she will have that done now. Patrick Benjamin MD FACP    Please note: This document has been produced using voice recognition software. Unrecognized errors in transcription may be present.

## 2020-01-07 NOTE — TELEPHONE ENCOUNTER
----- Message from Leo Hanna MD sent at 1/7/2020 12:10 PM EST -----  Please notify the patient of normal hip x-ray.

## 2020-01-27 ENCOUNTER — OFFICE VISIT (OUTPATIENT)
Dept: INTERNAL MEDICINE CLINIC | Age: 85
End: 2020-01-27

## 2020-01-27 VITALS
SYSTOLIC BLOOD PRESSURE: 136 MMHG | RESPIRATION RATE: 12 BRPM | OXYGEN SATURATION: 96 % | BODY MASS INDEX: 28.14 KG/M2 | HEART RATE: 85 BPM | HEIGHT: 59 IN | TEMPERATURE: 98.3 F | DIASTOLIC BLOOD PRESSURE: 72 MMHG | WEIGHT: 139.6 LBS

## 2020-01-27 DIAGNOSIS — K64.4 BLEEDING EXTERNAL HEMORRHOIDS: Primary | ICD-10-CM

## 2020-01-27 RX ORDER — ACETAMINOPHEN 500 MG
TABLET ORAL
COMMUNITY

## 2020-01-27 NOTE — PROGRESS NOTES
Demetrice Garza 5/17/1926, is a 80 y.o. female, who is seen today for a one-week history of seeing bright red blood on the toilet paper when she wipes after a bowel movement. No rectal pain. She has used milk of magnesia intermittently for quite a while and her movements stay quite soft. She is having no pain. She sees no blood on the stool or in the toilet. She had the same symptoms in January and March of last year and each time I checked her and found nonbleeding external hemorrhoids. 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     Essential hypertension, benign     Stable     Hypercholesterolemia      Current Outpatient Medications   Medication Sig Dispense Refill    acetaminophen (TYLENOL EXTRA STRENGTH) 500 mg tablet Take  by mouth every six (6) hours as needed for Pain.  cyclobenzaprine (FLEXERIL) 10 mg tablet Take  by mouth three (3) times daily as needed for Muscle Spasm(s).  meclizine (ANTIVERT) 25 mg tablet TAKE 1 TAB BY MOUTH THREE (3) TIMES DAILY AS NEEDED FOR DIZZINESS. 90 Tab 5    simvastatin (ZOCOR) 80 mg tablet TAKE 1 TABLET BY MOUTH EVERY DAY 90 Tab 3    metFORMIN ER (GLUCOPHAGE XR) 500 mg tablet Take 1 Tab by mouth daily (with breakfast). 90 Tab 3    aspirin delayed-release 81 mg tablet Take 1 Tab by mouth daily. 100 Tab 11    clopidogrel (PLAVIX) 75 mg tab 1 po qday 30 Tab 11    magnesium hydroxide (KATE MILK OF MAGNESIA) 400 mg/5 mL suspension Take 30 mL by mouth daily as needed for Constipation. Visit Vitals  /72 (BP 1 Location: Right arm, BP Patient Position: Sitting)   Pulse 85   Temp 98.3 °F (36.8 °C)   Resp 12   Ht 4' 11\" (1.499 m)   Wt 139 lb 9.6 oz (63.3 kg)   SpO2 96%   BMI 28.20 kg/m²     Rectal inspection reveals mild external hemorrhoids without active bleeding.   There is no tenderness of the hemorrhoids no induration of the hemorrhoids and digital rectal exam is otherwise unremarkable, tiny amount of stool is tested and is trace Hemoccult positive. Assessment: Bleeding hemorrhoids intermittently. She had been seen at 4 almost a year until a week ago, I told her this likely will stop on its own but if not over the next week or 2 I would be happy to send her to a specialist.  I told her I thought it was quite unlikely that with blood only on the toilet paper that it was coming from these hemorrhoids. Patrikc Aguilar MD FACP    Please note: This document has been produced using voice recognition software. Unrecognized errors in transcription may be present.

## 2020-02-24 ENCOUNTER — OFFICE VISIT (OUTPATIENT)
Dept: INTERNAL MEDICINE CLINIC | Age: 85
End: 2020-02-24

## 2020-02-24 VITALS
RESPIRATION RATE: 12 BRPM | HEIGHT: 59 IN | TEMPERATURE: 98.3 F | OXYGEN SATURATION: 96 % | HEART RATE: 82 BPM | BODY MASS INDEX: 27.62 KG/M2 | SYSTOLIC BLOOD PRESSURE: 123 MMHG | WEIGHT: 137 LBS | DIASTOLIC BLOOD PRESSURE: 66 MMHG

## 2020-02-24 DIAGNOSIS — K64.4 BLEEDING EXTERNAL HEMORRHOIDS: ICD-10-CM

## 2020-02-24 DIAGNOSIS — M54.32 SCIATICA OF LEFT SIDE: Primary | ICD-10-CM

## 2020-02-24 RX ORDER — GABAPENTIN 100 MG/1
100 CAPSULE ORAL 3 TIMES DAILY
Qty: 270 CAP | Refills: 3
Start: 2020-02-24 | End: 2020-02-26

## 2020-02-24 NOTE — PROGRESS NOTES
Regina Griffith 5/17/1926, is a 80 y.o. female, who is seen today for   Evaluation of \"hip pain\". Yesterday pain was worse than usual in her low back and into her left buttock and sometimes down the leg to below the knee posteriorly and today it is been somewhat the same on the right but not down the leg just into the buttock. It does not get worse when she walks. Also she periodically sees bright red blood in the toilet paper. I have checked her 2 or 3 times in the last year and found a bleeding external hemorrhoid each time and nothing else on rectal exam.    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     Essential hypertension, benign     Stable     Hypercholesterolemia      Current Outpatient Medications   Medication Sig Dispense Refill    acetaminophen (TYLENOL EXTRA STRENGTH) 500 mg tablet Take  by mouth every six (6) hours as needed for Pain.  cyclobenzaprine (FLEXERIL) 10 mg tablet Take  by mouth three (3) times daily as needed for Muscle Spasm(s).  meclizine (ANTIVERT) 25 mg tablet TAKE 1 TAB BY MOUTH THREE (3) TIMES DAILY AS NEEDED FOR DIZZINESS. 90 Tab 5    simvastatin (ZOCOR) 80 mg tablet TAKE 1 TABLET BY MOUTH EVERY DAY 90 Tab 3    metFORMIN ER (GLUCOPHAGE XR) 500 mg tablet Take 1 Tab by mouth daily (with breakfast). 90 Tab 3    aspirin delayed-release 81 mg tablet Take 1 Tab by mouth daily. 100 Tab 11    clopidogrel (PLAVIX) 75 mg tab 1 po qday 30 Tab 11    magnesium hydroxide (KATE MILK OF MAGNESIA) 400 mg/5 mL suspension Take 30 mL by mouth daily as needed for Constipation. Visit Vitals  /66 (BP 1 Location: Left arm, BP Patient Position: Sitting)   Pulse 82   Temp 98.3 °F (36.8 °C) (Oral)   Resp 12   Ht 4' 11\" (1.499 m)   Wt 137 lb (62.1 kg)   SpO2 96%   BMI 27.67 kg/m²     Normal straight leg raising bilaterally. No tenderness anywhere.   Very good range of motion of both hips, no pain or stiffness with internal and external rotation. Pulses are intact. Assessment: #1.  Sciatica, she may use Tylenol and I recommended she get back on gabapentin 100 mg 3 times a day, she has that at home. #2.  Occasional bright red blood per rectum for at least a couple of years, the only thing I found on rectal exam is an irritated external hemorrhoid, no mucosal abnormalities have been palpable or visible. I have reassured her there is nothing more than external hemorrhoids that I can find in she accepts that and does not want to have colonoscopy. Patrick Eisenberg MD FACP    Please note: This document has been produced using voice recognition software. Unrecognized errors in transcription may be present.

## 2020-02-26 ENCOUNTER — TELEPHONE (OUTPATIENT)
Dept: INTERNAL MEDICINE CLINIC | Age: 85
End: 2020-02-26

## 2020-02-26 NOTE — TELEPHONE ENCOUNTER
Patient's son is calling stating his Mother has been having problems with her leg and is using a walker.  He wants to know if you think she might benefit from PT.

## 2020-02-27 DIAGNOSIS — M54.30 SCIATICA, UNSPECIFIED LATERALITY: Primary | ICD-10-CM

## 2020-02-27 NOTE — TELEPHONE ENCOUNTER
Pts son aware of message below and verbalized understanding. No further questions or concerns from pts son at this time.

## 2020-03-02 NOTE — TELEPHONE ENCOUNTER
Son calling, says no one has called for physical therapy. Gave number on referral. Please follow up.

## 2020-03-02 NOTE — TELEPHONE ENCOUNTER
I called the patient and gave her the number to the [facility] for [facility] [employer] [phone]

## 2020-03-09 ENCOUNTER — HOSPITAL ENCOUNTER (OUTPATIENT)
Dept: PHYSICAL THERAPY | Age: 85
Discharge: HOME | End: 2020-03-09
Payer: MEDICARE

## 2020-03-09 PROCEDURE — 97161 PT EVAL LOW COMPLEX 20 MIN: CPT

## 2020-03-09 PROCEDURE — 97110 THERAPEUTIC EXERCISES: CPT

## 2020-03-09 NOTE — PROGRESS NOTES
[facility] [employer]  [address] [place] [address]  [phone] fax    Plan of Care/ Statement of Necessity for Physical Therapy Services    Patient name: Kevyn Kebede Start of Care: 3/9/2020   Referral source: Lorrene Goodpasture, * : 1926    Medical Diagnosis: Sciatica, unspecified side [M54.30]  Payor: VA MEDICARE / Plan: VA MEDICARE PART A & B / Product Type: Medicare /  Onset Date: 2020    Treatment Diagnosis:  Left hip pain   Prior Hospitalization: see medical history Provider#: [id number]   Medications: Verified on Patient summary List    Comorbidities:  Diabetes, history of CVA   Prior Level of Function: occasional use of rollator during ambulation, lives alone, Ind with ADLs     The Plan of Care and following information is based on the information from the initial evaluation. Assessment/ key information:  Pt. Is a 80year old female c/o left hip pain that began in January when getting up into bed. She reports symptoms haven't changed since onset. Occasional symptoms down leg to her calf. Denies bowel and bladder symptoms. Increased pain with standing and walking. She presents with decreased lumbar AROM with pain into extension. She has decreased hip ER mobility. She also has decreased hip abduction strength at 3/5. Positive RONALD test, negative hip flex/add/IR test. She has tightness and trigger points in left piriformis. Skilled PT is medically necessary in order to improve left hip strength and mobility for increased ease of ADLs and improved quality of life.      Evaluation Complexity History MEDIUM  Complexity : 1-2 comorbidities / personal factors will impact the outcome/ POC ; Examination MEDIUM Complexity : 3 Standardized tests and measures addressing body structure, function, activity limitation and / or participation in recreation  ;Presentation LOW Complexity : Stable, uncomplicated  ;Clinical Decision Making MEDIUM Complexity : FOTO score of 26-74  Overall Complexity Rating: LOW   Problem List: pain affecting function, decrease ROM, decrease strength, impaired gait/ balance, decrease ADL/ functional abilitiies, decrease activity tolerance, decrease flexibility/ joint mobility and decrease transfer abilities   Treatment Plan may include any combination of the following: Therapeutic exercise, Therapeutic activities, Neuromuscular re-education, Physical agent/modality, Gait/balance training, Manual therapy, Patient education, Self Care training, Functional mobility training and Home safety training  Patient / Family readiness to learn indicated by: asking questions  Persons(s) to be included in education: patient (P)  Barriers to Learning/Limitations: None  Patient Goal (s): to have less pain  Patient Self Reported Health Status: good  Rehabilitation Potential: good    Short Term Goals: To be accomplished in 1 weeks:  1. Patient will demonstrate compliance with HEP in order to improve left hip strength for increased ease of ADLs    Long Term Goals: To be accomplished in 4 weeks:  1. Patient will improve FOTO score by 12 points in order to demonstrate a significant improvement in function. 2. Patient will improve left hip abduction MMT to 4-/5 in order to increase ease of ambulation. 3. Patient will improve left hip ER AROM to 25 degrees in order to increase ease of transfers at home. 4. Patient will report pain at 2/10 or less during ambulation in order to improve quality of life. Frequency / Duration: Patient to be seen 2 times per week for 4 weeks.     Patient/ CarPatient/ Caregiver education and instruction: Diagnosis, prognosis, exercises   [x]  Plan of care has been reviewed with PTA    Re-certification: 8/5/76-7/22/63    Mat Andino, PT 3/9/2020 11:34 AM    ________________________________________________________________________    I certify that the above Therapy Services are being furnished while the patient is under my care. I agree with the treatment plan and certify that this therapy is necessary.     Physician's Signature:____________Date:_________TIME:________    ** Signature, Date and Time must be completed for valid certification **    Please sign and return to [facility] [employer] [facility]  [address] [place] [address]  [phone] fax

## 2020-03-09 NOTE — PROGRESS NOTES
PT DAILY TREATMENT NOTE/HIP [id number]    Patient Name: Gray Lovell  Date:3/9/2020  : 1926  [x]  Patient  Verified  Payor: VA MEDICARE / Plan: VA MEDICARE PART A & B / Product Type: Medicare /    In time: 10:35  Out time: 11:25  Total Treatment Time (min): 50  Visit #: 1 of 8    Medicare/BCBS Only   Total Timed Codes (min):  10 1:1 Treatment Time:  50     Treatment Area: Sciatica, unspecified side [M54.30]    SUBJECTIVE  Pain Level (0-10 scale): 4-5/10  []constant []intermittent []improving []worsening []no change since onset    Any medication changes, allergies to medications, adverse drug reactions, diagnosis change, or new procedure performed?: [x] No    [] Yes (see summary sheet for update)  Subjective functional status/changes:       Mechanism of Injury:  Pt. Reports in January was getting up on to bed and felt a pull in the back of the hip. No change in symptoms since onset. Sometimes feels it down her leg. Symptoms are intermittent. Sitting no symptoms. Walking increases symptoms. Less pain with rollator. Denies weakness in leg. No difficulty with getter dressed. Ind with ambulation. No changes with bowel and bladder symptoms.    Living Situation: lives by herself  Pt Goals: to have less pain    OBJECTIVE/EXAMINATION    8 min Therapeutic Exercise:  [x] See flow sheet : HEP   Rationale: increase strength, improve coordination and improve balance to improve the patients ability to increase ease of ADLs          With   [x] TE   [] TA   [] neuro   [] other: Patient Education: [x] Review HEP    [] Progressed/Changed HEP based on:   [] positioning   [] body mechanics   [] transfers   [] heat/ice application    [] other:      Physical Therapy Evaluation- Hip    Lumbar AROM flex: 75% ext: 25% painful SB B: 50% rotation B: 50%    Gait:  [] Normal    [x] Abnormal    [] Antalgic    [] NWB    Device: rollator     Describe: excessive fwd lean         ROM/Strength        AROM                     PROM Strength (1-5)  Hip Left Right Left Right Left Right   Flexion     4- 4-   Extension         Abduction     3    Adduction         ER 16 14       IR 26 38       Knee Left Right Left Right Left Right   Extension     4+ 4+   Flexion     4 4     Ankle DF/PF B: 4/5 tested in sitting     Flexibility: [] Unable to assess at this time  Hamstrings:    (L) Tightness= [] WNL   [] Min   [x] Mod   [] Severe    (R) Tightness= [] WNL   [] Min   [x] Mod   [] Severe  Quadriceps:    (L) Tightness= [] WNL   [] Min   [] Mod   [] Severe    (R) Tightness= [] WNL   [] Min   [] Mod   [] Severe  Gastroc:    (L) Tightness= [] WNL   [] Min   [] Mod   [] Severe    (R) Tightness= [] WNL   [] Min   [] Mod   [] Severe                                  Palpation  [] Min  [x] Mod  [] Severe    Location: piriformis and glut max  [] Min  [] Mod  [] Severe    Location:  [] Min  [] Mod  [] Severe    Location:    Optional Tests  Raul/ Juve Test: [] Neg    [x] Pos  Ariel Test:  [] Neg    [] Pos  Scouring Test: [x] Neg    [] Pos  Trendelenberg:  [] Neg    [] Pos [] Left    [] Right  OberTest:   [] Neg    [] Pos  Ely's Test:  [] Neg    [] Pos  Piriformis Test:  [] Neg    [] Pos  Sub-talor alignment: [] Neurtral [] Pronation [] Supination  Forefoot alignment: [] Neutral [] Varus [] Valgus  Functional Leg Length (cm):   Right:  Left:  Discrepancy:    Other tests/ comments:       Pain Level (0-10 scale) post treatment: 4-5/10    ASSESSMENT/Changes in Function:     See progress note           Progress towards goals / Updated goals:  See progress note    PLAN  []  Upgrade activities as tolerated     [x]  Continue plan of care  []  Update interventions per flow sheet       []  Discharge due to:_  []  Other:_      Teofilo Allen, PT 3/9/2020  10:37 AM

## 2020-03-09 NOTE — PROGRESS NOTES
[facility] [place]  [address] [facility]  [phone] fax    Physical Therapy Discharge Summary    Patient name: Katharine Valle Start of Care: 19   Referral source: Warden Roni MD : 1926   Medical/Treatment Diagnosis: Dizziness and giddiness [R42]  Payor: VA MEDICARE / Plan: VA MEDICARE PART A & B / Product Type: Medicare /  Onset Date:15 years      Prior Hospitalization: see medical history Provider#: [id number]   Medications: Verified on Patient Summary List     Comorbidities: HTN, type II diabetes with peripheral vascular disease, vasovagal syncope, aortic valve disorder, hx of BPPV, chronic vertigo, chronic anxiety    Prior Level of Function: lives alone in a 1 story home with 2-3 steps to enter without handrail,  Right handed, Ind with ADLs, Ind with household management, walks [place] 2-3x per week           Visits from Start of Care: 5    Missed Visits: 0    Reporting Period : 19 to 19    Summary of Care:  Goal:  Pt will be compliant with initial HEP in order to optimize therapy outcomes. Status at last note/certification: Not assessed d/t unplanned DC   Status at discharge: not met    Goal: Pt will improve FOTO by 3 points in order to demonstrate functional improvement. Status at last note/certification: Not assessed d/t unplanned DC   Status at discharge: not met    Goal: Pt will report 50% improvement in dizzy sx in order to improve QOL. Status at last note/certification: Progressing. Not formally assessed d/t unplanned DC   Status at discharge: not met    Goal: Pt will perform SOT WNL compared to closest age-related norms in order to demonstrate reduced fall risk.   Status at last note/certification: Performed initial SOT. Status at discharge: not met    ASSESSMENT/RECOMMENDATIONS:  Pt was making slow, steady progress in therapy.   Static balance was improving; however, she continued to report mild dizziness with head turns and bending forward. Pt was placed on hold d/t vacation. She was instructed to call to schedule more appointments after vacation if she wished to continue. Pt did not follow up with therapy and is DC at this time.       [x]Discontinue therapy: []Patient has reached or is progressing toward set goals      [x]Patient is non-compliant or has abdicated      []Due to lack of appreciable progress towards set goals    Beckie Land, PT 3/9/2020 4:29 PM

## 2020-03-12 ENCOUNTER — APPOINTMENT (OUTPATIENT)
Dept: PHYSICAL THERAPY | Age: 85
End: 2020-03-12
Payer: MEDICARE

## 2020-03-17 ENCOUNTER — TELEPHONE (OUTPATIENT)
Dept: PHYSICAL THERAPY | Age: 85
End: 2020-03-17

## 2020-03-25 ENCOUNTER — APPOINTMENT (OUTPATIENT)
Dept: PHYSICAL THERAPY | Age: 85
End: 2020-03-25
Payer: MEDICARE

## 2020-04-02 RX ORDER — CLOPIDOGREL BISULFATE 75 MG/1
TABLET ORAL
Qty: 30 TAB | Refills: 11 | Status: SHIPPED | OUTPATIENT
Start: 2020-04-02

## 2020-04-02 NOTE — TELEPHONE ENCOUNTER
Requested Prescriptions     Pending Prescriptions Disp Refills    clopidogreL (Plavix) 75 mg tab 30 Tab 11     Si po qday     Script scanned to chart.

## 2020-05-27 ENCOUNTER — TELEPHONE (OUTPATIENT)
Dept: INTERNAL MEDICINE CLINIC | Age: 85
End: 2020-05-27

## 2020-05-27 NOTE — TELEPHONE ENCOUNTER
Patient's son, [address], is calling stating his Mother is trying to get a patch, or something non-invasive, to measure her blood sugar level.  She would like that to be sent to the [facility] in [place]

## 2020-05-27 NOTE — TELEPHONE ENCOUNTER
Patients son reached, he was given message per DR. Jeremy Rivero understanding and had no further questions.

## 2020-05-27 NOTE — TELEPHONE ENCOUNTER
Pt son called he is concerned because his mom drinks gator aid multiple times a day along with taking milk of magnesia . He said his mom told him DR Arun Muñoz told her to do that .  Patient insist that is what she is suppose to do  Pt son is asking for the nurse to call him back - Shanideonte Juares- [phone]

## 2020-05-27 NOTE — TELEPHONE ENCOUNTER
Called and spoke with patient and her son. Gave message below. They verbalized understanding. No further questions or concerns at this time.

## 2020-05-27 NOTE — TELEPHONE ENCOUNTER
Cruzito Mercedes MD  [facility] Nurses 47 minutes ago (2:33 PM)      I have told her many times over the last year any type of this testing at home is not appropriate.  She is only on metformin which does not cause the glucose to go below normal so we measure hemoglobin A1c periodically to make sure her glucose is not high and it has not been too high.     Routing comment

## 2020-06-17 ENCOUNTER — TELEPHONE (OUTPATIENT)
Dept: INTERNAL MEDICINE CLINIC | Age: 85
End: 2020-06-17

## 2020-06-17 NOTE — TELEPHONE ENCOUNTER
Son calling, says mother is out of state with him and needs to have some teeth pulled. Wants to know if RM can sign the forms clearing her for the dental surgery? She has not been seen since Feb.    No date has been scheduled yet because they need the clearance before they will set a date.

## 2020-06-18 NOTE — TELEPHONE ENCOUNTER
Son will call back for fax number. He is driving. Says mother wants to have her labs drawn before going to dentist. He wants to have lab order faxed to him. He will call with number.

## 2020-07-24 NOTE — PROGRESS NOTES
[facility] Florecita Money  [address] [facility]  [phone] fax    Physical Therapy Discharge Summary    Patient name: Arnetha Hodgkin Start of Care: 3/9/2020   Referral source: Oral Norton, * : 1926                Medical Diagnosis: Sciatica, unspecified side [M54.30]  [place]or: [place] MED[place] / Plan: [place] MEDICARE PART A & B / Product Type: Medicare /  Onset Date: 2020                Treatment Diagnosis:  Left hip pain   Prior Hospitalization: see medical history Provider#: [id number]   Medications: Verified on Patient summary List    Comorbidities:  Diabetes, history of CVA   Prior Level of Function: occasional use of rollator during ambulation, lives alone, Ind with ADLs     Visits from Start of Care: 1    Missed Visits: 3    Reporting Period : 3/9/20 to 3/9/20    Summary of Care:  Goal: Patient will improve FOTO score by 12 points in order to demonstrate a significant improvement in function. Status at last note/certification: 48  Status at discharge: not met    Goal: Patient will improve left hip abduction MMT to 4-/5 in order to increase ease of ambulation. Status at last note/certification: 3/  Status at discharge: not met    Goal: Patient will improve left hip ER AROM to 25 degrees in order to increase ease of transfers at home. Status at last note/certification: 14 degrees  Status at discharge: not met    Goal: Patient will report pain at 2/10 or less during ambulation in order to improve quality of life. Status at last note/certification:   Status at discharge: not met    Unable to further assess due to COVID-19 restrictions. Pt declined telehealth services and will be discharged from outpatient therapy at this time.       ASSESSMENT/RECOMMENDATIONS:  [x]Discontinue therapy: []Patient has reached or is progressing toward set goals      []Patient is non-compliant or has abdicated      []Due to lack of appreciable progress towards set goals       X: COVID-19    Fito Rai, PT 7/24/2020 8:22 AM

## 2020-07-29 ENCOUNTER — TELEPHONE (OUTPATIENT)
Dept: INTERNAL MEDICINE CLINIC | Age: 85
End: 2020-07-29

## 2020-07-29 NOTE — TELEPHONE ENCOUNTER
Please call patient. She wants to know who she should see. She also has a medication question for you.

## 2020-07-31 ENCOUNTER — TELEPHONE (OUTPATIENT)
Dept: INTERNAL MEDICINE CLINIC | Age: 85
End: 2020-07-31

## 2020-07-31 NOTE — TELEPHONE ENCOUNTER
Pt's son called, pt is residing in [place] with him now. He asked for a release of record form be mailed to his address so they can retrieve patient's records. Form going out in today's mail.

## 2020-08-05 RX ORDER — METFORMIN HYDROCHLORIDE 500 MG/1
TABLET, EXTENDED RELEASE ORAL
Qty: 90 TAB | Refills: 0 | Status: SHIPPED | OUTPATIENT
Start: 2020-08-05 | End: 2020-10-29

## 2020-08-05 NOTE — TELEPHONE ENCOUNTER
Son calling, says mother has appt with doctor in [place] but can't get in until next month. Needs refill to hold her til then.

## 2020-08-07 NOTE — TELEPHONE ENCOUNTER
Son called stating the office she will be transferring to wants the last 5 years of her records. I advised him the form should be coming in the mail and that he can fill that in.

## 2020-08-12 ENCOUNTER — TELEPHONE (OUTPATIENT)
Dept: INTERNAL MEDICINE CLINIC | Age: 85
End: 2020-08-12

## 2020-09-29 RX ORDER — SIMVASTATIN 80 MG/1
80 TABLET, FILM COATED ORAL DAILY
Qty: 90 TAB | Refills: 0 | Status: SHIPPED | OUTPATIENT
Start: 2020-09-29

## 2020-09-29 NOTE — TELEPHONE ENCOUNTER
Last Visit: 2/24/20 with MD Eric Looney  Next Appointment: none  Previous Refill Encounter(s): 11/17/19 #90 with 3 refills    Requested Prescriptions     Pending Prescriptions Disp Refills    simvastatin (ZOCOR) 80 mg tablet 90 Tab 0     Sig: Take 1 Tab by mouth daily.

## 2020-09-29 NOTE — TELEPHONE ENCOUNTER
Please let her know that no additional refills will be given after today as her PCP has retired and she needs to establish care with a new PCP.     Dr. Aleks Cotton  [facility], [address] [place] [address], [address] [place] [address] Str.  Phone: [phone]  Fax: [phone]

## 2020-10-28 NOTE — TELEPHONE ENCOUNTER
As per last note in chart, patient has moved to [place] and will be establishing with new PCP there. Please see if this refill was requested.

## 2020-10-29 RX ORDER — METFORMIN HYDROCHLORIDE 500 MG/1
TABLET, EXTENDED RELEASE ORAL
Qty: 30 TAB | Refills: 0 | Status: SHIPPED | OUTPATIENT
Start: 2020-10-29

## 2020-10-29 NOTE — TELEPHONE ENCOUNTER
Patient has moved to [place] and is in the process of establishing with a new provider. She states she will run out of the metformin this week. She is asking for a 30 day supply. She is aware she needs to request future refills from her new PCP and that this will be the last RX sent in.

## 2021-03-09 NOTE — TELEPHONE ENCOUNTER
Spoke with patient verified name and , reports having some pain at base of neck where had stroke. Patient reports also having some dizziness during AM and during the day. Patient asked if any other stroke like symptoms and reports no blurred vision, no uneven facial smile, no arm discomfort, no speech impairment. Patient reports taking milk of magnesia, every night for contipation and wondered if has decreased or effectecd her medication. NP to advise if OK to take tylenol. Quality 111:Pneumonia Vaccination Status For Older Adults: Pneumococcal Vaccination Previously Received Quality 130: Documentation Of Current Medications In The Medical Record: Current Medications Documented Detail Level: Detailed Quality 431: Preventive Care And Screening: Unhealthy Alcohol Use - Screening: Patient screened for unhealthy alcohol use using a single question and scores less than 2 times per year Quality 110: Preventive Care And Screening: Influenza Immunization: Influenza Immunization Administered during Influenza season Quality 47: Advance Care Plan: Advance care planning not documented, reason not otherwise specified. Quality 128: Preventive Care And Screening: Body Mass Index (Bmi) Screening And Follow-Up Plan: BMI not documented, reason not otherwise specified. Quality 226: Preventive Care And Screening: Tobacco Use: Screening And Cessation Intervention: Patient screened for tobacco use and is an ex/non-smoker
